# Patient Record
Sex: MALE | Race: BLACK OR AFRICAN AMERICAN | Employment: OTHER | URBAN - METROPOLITAN AREA
[De-identification: names, ages, dates, MRNs, and addresses within clinical notes are randomized per-mention and may not be internally consistent; named-entity substitution may affect disease eponyms.]

---

## 2019-07-08 ENCOUNTER — APPOINTMENT (OUTPATIENT)
Dept: GENERAL RADIOLOGY | Age: 39
End: 2019-07-08
Attending: EMERGENCY MEDICINE
Payer: SUBSIDIZED

## 2019-07-08 ENCOUNTER — HOSPITAL ENCOUNTER (OUTPATIENT)
Age: 39
Setting detail: OBSERVATION
LOS: 1 days | Discharge: HOME | End: 2019-07-10
Attending: EMERGENCY MEDICINE | Admitting: HOSPITALIST
Payer: SUBSIDIZED

## 2019-07-08 DIAGNOSIS — I50.9 ACUTE CONGESTIVE HEART FAILURE, UNSPECIFIED HEART FAILURE TYPE (HCC): Primary | ICD-10-CM

## 2019-07-08 PROCEDURE — 99285 EMERGENCY DEPT VISIT HI MDM: CPT

## 2019-07-08 PROCEDURE — 83880 ASSAY OF NATRIURETIC PEPTIDE: CPT

## 2019-07-08 PROCEDURE — 74011000250 HC RX REV CODE- 250: Performed by: EMERGENCY MEDICINE

## 2019-07-08 PROCEDURE — 94640 AIRWAY INHALATION TREATMENT: CPT

## 2019-07-08 PROCEDURE — 74011250637 HC RX REV CODE- 250/637: Performed by: EMERGENCY MEDICINE

## 2019-07-08 PROCEDURE — 71045 X-RAY EXAM CHEST 1 VIEW: CPT

## 2019-07-08 PROCEDURE — 84484 ASSAY OF TROPONIN QUANT: CPT

## 2019-07-08 PROCEDURE — 93005 ELECTROCARDIOGRAM TRACING: CPT

## 2019-07-08 PROCEDURE — 85025 COMPLETE CBC W/AUTO DIFF WBC: CPT

## 2019-07-08 PROCEDURE — 85379 FIBRIN DEGRADATION QUANT: CPT

## 2019-07-08 PROCEDURE — 94762 N-INVAS EAR/PLS OXIMTRY CONT: CPT

## 2019-07-08 PROCEDURE — 77030029684 HC NEB SM VOL KT MONA -A

## 2019-07-08 PROCEDURE — 80053 COMPREHEN METABOLIC PANEL: CPT

## 2019-07-08 RX ORDER — IPRATROPIUM BROMIDE AND ALBUTEROL SULFATE 2.5; .5 MG/3ML; MG/3ML
3 SOLUTION RESPIRATORY (INHALATION)
Status: COMPLETED | OUTPATIENT
Start: 2019-07-08 | End: 2019-07-08

## 2019-07-08 RX ADMIN — IPRATROPIUM BROMIDE AND ALBUTEROL SULFATE 3 ML: .5; 3 SOLUTION RESPIRATORY (INHALATION) at 23:16

## 2019-07-08 RX ADMIN — NITROGLYCERIN 1 INCH: 20 OINTMENT TOPICAL at 23:19

## 2019-07-09 ENCOUNTER — APPOINTMENT (OUTPATIENT)
Dept: NON INVASIVE DIAGNOSTICS | Age: 39
End: 2019-07-09
Attending: HOSPITALIST
Payer: SUBSIDIZED

## 2019-07-09 ENCOUNTER — HOME HEALTH ADMISSION (OUTPATIENT)
Dept: HOME HEALTH SERVICES | Facility: HOME HEALTH | Age: 39
End: 2019-07-09

## 2019-07-09 PROBLEM — I50.21 ACUTE SYSTOLIC CHF (CONGESTIVE HEART FAILURE) (HCC): Status: ACTIVE | Noted: 2019-07-09

## 2019-07-09 PROBLEM — I50.41 ACUTE COMBINED SYSTOLIC (CONGESTIVE) AND DIASTOLIC (CONGESTIVE) HEART FAILURE (HCC): Status: ACTIVE | Noted: 2019-07-09

## 2019-07-09 PROBLEM — I50.9 ACUTE CHF (CONGESTIVE HEART FAILURE) (HCC): Status: ACTIVE | Noted: 2019-07-09

## 2019-07-09 PROBLEM — E66.01 MORBID OBESITY (HCC): Status: ACTIVE | Noted: 2019-07-09

## 2019-07-09 PROBLEM — I10 HTN (HYPERTENSION): Status: ACTIVE | Noted: 2019-07-09

## 2019-07-09 LAB
ALBUMIN SERPL-MCNC: 3.5 G/DL (ref 3.4–5)
ALBUMIN/GLOB SERPL: 0.9 {RATIO} (ref 0.8–1.7)
ALP SERPL-CCNC: 98 U/L (ref 45–117)
ALT SERPL-CCNC: 38 U/L (ref 16–61)
ANION GAP SERPL CALC-SCNC: 6 MMOL/L (ref 3–18)
AST SERPL-CCNC: 21 U/L (ref 15–37)
ATRIAL RATE: 112 BPM
BASOPHILS # BLD: 0 K/UL (ref 0–0.1)
BASOPHILS NFR BLD: 0 % (ref 0–2)
BILIRUB SERPL-MCNC: 0.6 MG/DL (ref 0.2–1)
BNP SERPL-MCNC: 1132 PG/ML (ref 0–450)
BUN SERPL-MCNC: 12 MG/DL (ref 7–18)
BUN/CREAT SERPL: 13 (ref 12–20)
CALCIUM SERPL-MCNC: 9.1 MG/DL (ref 8.5–10.1)
CALCULATED P AXIS, ECG09: 70 DEGREES
CALCULATED R AXIS, ECG10: 25 DEGREES
CALCULATED T AXIS, ECG11: 65 DEGREES
CHLORIDE SERPL-SCNC: 107 MMOL/L (ref 100–108)
CHOLEST SERPL-MCNC: 200 MG/DL
CK MB CFR SERPL CALC: 1.9 % (ref 0–4)
CK MB CFR SERPL CALC: 1.9 % (ref 0–4)
CK MB SERPL-MCNC: 2 NG/ML (ref 5–25)
CK MB SERPL-MCNC: 2.1 NG/ML (ref 5–25)
CK SERPL-CCNC: 108 U/L (ref 39–308)
CK SERPL-CCNC: 110 U/L (ref 39–308)
CO2 SERPL-SCNC: 25 MMOL/L (ref 21–32)
CREAT SERPL-MCNC: 0.95 MG/DL (ref 0.6–1.3)
D DIMER PPP FEU-MCNC: 0.33 UG/ML(FEU)
DIAGNOSIS, 93000: NORMAL
DIFFERENTIAL METHOD BLD: NORMAL
EOSINOPHIL # BLD: 0.2 K/UL (ref 0–0.4)
EOSINOPHIL NFR BLD: 2 % (ref 0–5)
ERYTHROCYTE [DISTWIDTH] IN BLOOD BY AUTOMATED COUNT: 13 % (ref 11.6–14.5)
GLOBULIN SER CALC-MCNC: 3.8 G/DL (ref 2–4)
GLUCOSE SERPL-MCNC: 93 MG/DL (ref 74–99)
HCT VFR BLD AUTO: 42.3 % (ref 36–48)
HDLC SERPL-MCNC: 34 MG/DL (ref 40–60)
HDLC SERPL: 5.9 {RATIO} (ref 0–5)
HGB BLD-MCNC: 14.2 G/DL (ref 13–16)
LDLC SERPL CALC-MCNC: 135 MG/DL (ref 0–100)
LIPID PROFILE,FLP: ABNORMAL
LYMPHOCYTES # BLD: 3.5 K/UL (ref 0.9–3.6)
LYMPHOCYTES NFR BLD: 35 % (ref 21–52)
MCH RBC QN AUTO: 28.2 PG (ref 24–34)
MCHC RBC AUTO-ENTMCNC: 33.6 G/DL (ref 31–37)
MCV RBC AUTO: 84.1 FL (ref 74–97)
MONOCYTES # BLD: 0.8 K/UL (ref 0.05–1.2)
MONOCYTES NFR BLD: 8 % (ref 3–10)
NEUTS SEG # BLD: 5.5 K/UL (ref 1.8–8)
NEUTS SEG NFR BLD: 55 % (ref 40–73)
P-R INTERVAL, ECG05: 158 MS
PLATELET # BLD AUTO: 284 K/UL (ref 135–420)
PMV BLD AUTO: 10.2 FL (ref 9.2–11.8)
POTASSIUM SERPL-SCNC: 3.6 MMOL/L (ref 3.5–5.5)
PROT SERPL-MCNC: 7.3 G/DL (ref 6.4–8.2)
Q-T INTERVAL, ECG07: 354 MS
QRS DURATION, ECG06: 86 MS
QTC CALCULATION (BEZET), ECG08: 483 MS
RBC # BLD AUTO: 5.03 M/UL (ref 4.7–5.5)
SODIUM SERPL-SCNC: 138 MMOL/L (ref 136–145)
TRIGL SERPL-MCNC: 155 MG/DL (ref ?–150)
TROPONIN I SERPL-MCNC: 0.02 NG/ML (ref 0–0.04)
TROPONIN I SERPL-MCNC: <0.02 NG/ML (ref 0–0.04)
TROPONIN I SERPL-MCNC: <0.02 NG/ML (ref 0–0.04)
VENTRICULAR RATE, ECG03: 112 BPM
VLDLC SERPL CALC-MCNC: 31 MG/DL
WBC # BLD AUTO: 9.9 K/UL (ref 4.6–13.2)

## 2019-07-09 PROCEDURE — 74011250636 HC RX REV CODE- 250/636: Performed by: EMERGENCY MEDICINE

## 2019-07-09 PROCEDURE — 96376 TX/PRO/DX INJ SAME DRUG ADON: CPT

## 2019-07-09 PROCEDURE — 93306 TTE W/DOPPLER COMPLETE: CPT

## 2019-07-09 PROCEDURE — 80061 LIPID PANEL: CPT

## 2019-07-09 PROCEDURE — 74011250636 HC RX REV CODE- 250/636: Performed by: PHYSICIAN ASSISTANT

## 2019-07-09 PROCEDURE — 96374 THER/PROPH/DIAG INJ IV PUSH: CPT

## 2019-07-09 PROCEDURE — 99218 HC RM OBSERVATION: CPT

## 2019-07-09 PROCEDURE — 36415 COLL VENOUS BLD VENIPUNCTURE: CPT

## 2019-07-09 PROCEDURE — 74011250637 HC RX REV CODE- 250/637: Performed by: EMERGENCY MEDICINE

## 2019-07-09 PROCEDURE — 82550 ASSAY OF CK (CPK): CPT

## 2019-07-09 RX ORDER — CARVEDILOL 12.5 MG/1
6.25 TABLET ORAL
Status: COMPLETED | OUTPATIENT
Start: 2019-07-09 | End: 2019-07-09

## 2019-07-09 RX ORDER — LISINOPRIL 5 MG/1
5 TABLET ORAL
Status: COMPLETED | OUTPATIENT
Start: 2019-07-09 | End: 2019-07-09

## 2019-07-09 RX ORDER — FUROSEMIDE 10 MG/ML
40 INJECTION INTRAMUSCULAR; INTRAVENOUS 2 TIMES DAILY
Status: COMPLETED | OUTPATIENT
Start: 2019-07-09 | End: 2019-07-10

## 2019-07-09 RX ORDER — FUROSEMIDE 10 MG/ML
20 INJECTION INTRAMUSCULAR; INTRAVENOUS
Status: COMPLETED | OUTPATIENT
Start: 2019-07-09 | End: 2019-07-09

## 2019-07-09 RX ORDER — FUROSEMIDE 10 MG/ML
40 INJECTION INTRAMUSCULAR; INTRAVENOUS DAILY
Status: DISCONTINUED | OUTPATIENT
Start: 2019-07-09 | End: 2019-07-09

## 2019-07-09 RX ADMIN — FUROSEMIDE 40 MG: 10 INJECTION, SOLUTION INTRAMUSCULAR; INTRAVENOUS at 18:51

## 2019-07-09 RX ADMIN — CARVEDILOL 6.25 MG: 12.5 TABLET, FILM COATED ORAL at 00:43

## 2019-07-09 RX ADMIN — LISINOPRIL 5 MG: 5 TABLET ORAL at 00:43

## 2019-07-09 RX ADMIN — FUROSEMIDE 20 MG: 10 INJECTION, SOLUTION INTRAMUSCULAR; INTRAVENOUS at 00:43

## 2019-07-09 RX ADMIN — FUROSEMIDE 40 MG: 10 INJECTION, SOLUTION INTRAMUSCULAR; INTRAVENOUS at 12:48

## 2019-07-09 NOTE — CONSULTS
[facility] - Consult Note    Date of  Admission: 7/8/2019 11:05 PM   Primary Care Physician:  None  Patient seen and examined independently. The patient describes a history of increasing dyspnea with exertion and PND and orthopnea. Echo demonstrates marked reduction of systolic function and grade 2 diastolic dysfunction. Patient has single dose lisinopril. Will add Entresto 24/26 after suitable waiting. We will continue IV Lasix for diuresis. Agree with assessment and plan as noted below. Gilmer Ag MD   Assessment:     Patient Active Problem List   Diagnosis Code    Acute systolic CHF (congestive heart failure) ([facility]) I50.21    HTN (hypertension) I10    Morbid obesity ([facility] E66.01    Acute CH[facility] (congestive [facility] failure) ([facility] I50.9       - Clinical concern for acute CHF exacerbation with BNP 1132, vascular congestion on CXR without overt pulmonary edema  - Elevated blood pressure without official diagnosis of hypertension   - Obesity     Plan:     - Echo completed this admission with report to follow  - Starting IV Lasix for diuresis, strict I&O's, daily weights, follow renal function  - Will start low dose antihypertensive dependent on echo results     History of Present Illness: This is a 46 y.o. male admitted for Acute systolic CHF (congestive heart failure) ([facility] [I50.21]  Acute CHF (congestive heart failure) ([facility] [I50.9]. Patient complains of:  Shortness of breath     This is a 46year old male that presented with progressive shortness of breath, noticed over the last several months but acutely worsening last night. The shortness of breath progressed to while he was at rest in the bed, was unable to find a comfortable position. He had orthopnea and PND as well. He did not have any LE edema. Denies any chest pain. He believed that he may have had PNA and was trying to take Mucinex over the last several days, no fever or cough. Denies personal history of CAD or CHF.  He was told he had an isolated elevated BP reading while at urgent care several months ago, never was officially told of hypertension. Denies family history of CAD. Denies tobacco or illicits, only occasional ETOH use socially. Cardiac risk factors: obesity, male gender      Review of Symptoms:  Except as stated above include:  Constitutional:  negative  Respiratory:  +dyspnea  Cardiovascular: per HPI  Gastrointestinal: negative  Genitourinary:  negative  Musculoskeletal:  Negative  Neurological:  Negative  Dermatological:  Negative  Endocrinological: Negative  Psychological:  Negative    A comprehensive review of systems was negative except for that written in the HPI. Past Medical History:   History reviewed. No pertinent past medical history. Social History:     Social History     Socioeconomic History    Marital status: SINGLE     Spouse name: Not on file    Number of children: Not on file    Years of education: Not on file    Highest education level: Not on file   Tobacco Use    Smoking status: Never Smoker    Smokeless tobacco: Never Used   Substance and Sexual Activity    Alcohol use: Never     Frequency: Never    Drug use: Never        Family History:   History reviewed. No pertinent family history. Medications:   No Known Allergies     No current facility-administered medications for this encounter.           Physical Exam:     Visit Vitals  /87   Pulse 94   Temp 98.1 °F (36.7 °C)   Resp 18   Ht 5' 9\" (1.753 m)   Wt (!) 354 lb 3.2 oz (160.7 kg)   SpO2 95%   BMI 52.31 kg/m²     BP Readings from Last 3 Encounters:   07/09/19 118/87     Pulse Readings from Last 3 Encounters:   07/09/19 94     Wt Readings from Last 3 Encounters:   07/09/19 (!) 354 lb 3.2 oz (160.7 kg)       General:  alert, cooperative, no distress  Neck:  nontender, no JVD  Lungs:  Decreased breath sounds RLL  Heart:  regular rate and rhythm, S1, S2 normal, no murmur, click, rub or gallop  Abdomen:  abdomen is soft without significant tenderness, masses, organomegaly or guarding  Extremities:  extremities normal, atraumatic, no cyanosis or edema  Skin: Warm and dry. no hyperpigmentation, vitiligo, or suspicious lesions  Neuro: alert, oriented x3, affect appropriate  Psych: non focal     Data Review:     Recent Labs     07/08/19  2345   WBC 9.9   HGB 14.2   HCT 42.3        Recent Labs     07/08/19  2345      K 3.6      CO2 25   GLU 93   BUN 12   CREA 0.95   CA 9.1   ALB 3.5   SGOT 21   ALT 38       Results for orders placed or performed during the hospital encounter of 07/08/19   EKG, 12 LEAD, INITIAL   Result Value Ref Range    Ventricular Rate 112 BPM    Atrial Rate 112 BPM    P-R Interval 158 ms    QRS Duration 86 ms    Q-T Interval 354 ms    QTC Calculation (Bezet) 483 ms    Calculated P Axis 70 degrees    Calculated R Axis 25 degrees    Calculated T Axis 65 degrees    Diagnosis       Sinus tachycardia  Nonspecific T wave abnormality  Abnormal ECG  No previous ECGs available         All Cardiac Markers in the last 24 hours:    Lab Results   Component Value Date/Time     07/09/2019 06:10 AM    CKMB 2.1 07/09/2019 06:10 AM    CKND1 1.9 07/09/2019 06:10 AM    TROIQ 0.02 07/09/2019 06:10 AM    TROIQ <0.02 07/08/2019 11:45 PM       Last Lipid:  No results found for: CHOL, CHOLX, CHLST, CHOLV, HDL, LDL, LDLC, DLDLP, TGLX, TRIGL, TRIGP, CHHD, CHHDX    Signed By: Milla Barnes December July 9, 2019

## 2019-07-09 NOTE — ED PROVIDER NOTES
Imelda Greenwood is a 46 y.o. Male with no prior significant medical history with complaints of increased shortness of breath with exertion and lying flat for the last several months along with swelling in his lower extremities. Patient denies any fever, productive cough, vomiting or diarrhea. Patient denies any prior history of hypertension, congestive heart failure, chronic kidney disease, smoking or diabetes. Patient has no prior history of chronic lung disease other. He states he went to an urgent care where he was told nothing was wrong    The history is provided by the patient. History reviewed. No pertinent past medical history. History reviewed. No pertinent surgical history. History reviewed. No pertinent family history.     Social History     Socioeconomic History    Marital status: SINGLE     Spouse name: Not on file    Number of children: Not on file    Years of education: Not on file    Highest education level: Not on file   Occupational History    Not on file   Social Needs    Financial resource strain: Not on file    Food insecurity:     Worry: Not on file     Inability: Not on file    Transportation needs:     Medical: Not on file     Non-medical: Not on file   Tobacco Use    Smoking status: Never Smoker    Smokeless tobacco: Never Used   Substance and Sexual Activity    Alcohol use: Never     Frequency: Never    Drug use: Never    Sexual activity: Not on file   Lifestyle    Physical activity:     Days per week: Not on file     Minutes per session: Not on file    Stress: Not on file   Relationships    Social connections:     Talks on phone: Not on file     Gets together: Not on file     Attends Evangelical service: Not on file     Active member of club or organization: Not on file     Attends meetings of clubs or organizations: Not on file     Relationship status: Not on file    Intimate partner violence:     Fear of current or ex partner: Not on file     Emotionally abused: Not on file     Physically abused: Not on file     Forced sexual activity: Not on file   Other Topics Concern    Not on file   Social History Narrative    Not on file         ALLERGIES: Patient has no known allergies. Review of Systems   Constitutional: Negative for fever. HENT: Negative for sore throat. Eyes: Negative for visual disturbance. Respiratory: Positive for cough, chest tightness, shortness of breath and wheezing. Cardiovascular: Positive for leg swelling. Negative for chest pain. Gastrointestinal: Negative for abdominal pain. Endocrine: Negative for polyuria. Genitourinary: Negative for difficulty urinating. Musculoskeletal: Negative for gait problem. Skin: Negative for rash. Allergic/Immunologic: Negative for immunocompromised state. Neurological: Negative for syncope. Psychiatric/Behavioral: Positive for sleep disturbance. Vitals:    07/09/19 0006 07/09/19 0007 07/09/19 0008 07/09/19 0009   BP:       Pulse: (!) 112 (!) 111 (!) 110 (!) 111   Resp: 19 16 19 19   Temp:       SpO2: 97% 97% 96% 96%   Weight:       Height:                Physical Exam   Constitutional: He is oriented to person, place, and time. He appears well-developed and well-nourished. Non-toxic appearance. He does not appear ill. No distress. HENT:   Head: Normocephalic and atraumatic. Right Ear: External ear normal.   Left Ear: External ear normal.   Nose: Nose normal.   Mouth/Throat: Oropharynx is clear and moist. No oropharyngeal exudate. Eyes: Pupils are equal, round, and reactive to light. Conjunctivae and EOM are normal.   Neck: Normal range of motion. Cardiovascular: Regular rhythm, normal heart sounds and intact distal pulses. Tachycardia present. Pulmonary/Chest: Effort normal. No respiratory distress. He has no decreased breath sounds. He has wheezes. He has no rhonchi. He has no rales. Abdominal: Soft. There is no tenderness. Musculoskeletal: Normal range of motion. He exhibits edema. Neurological: He is alert and oriented to person, place, and time. Skin: Skin is warm and dry. Capillary refill takes less than 2 seconds. He is not diaphoretic. Psychiatric: His behavior is normal.   Nursing note and vitals reviewed.        MDM       Procedures  Vitals:  Patient Vitals for the past 12 hrs:   Temp Pulse Resp BP SpO2   07/09/19 0009  (!) 111 19  96 %   07/09/19 0008  (!) 110 19  96 %   07/09/19 0007  (!) 111 16  97 %   07/09/19 0006  (!) 112 19  97 %   07/09/19 0001  (!) 111 21 (!) 145/99 96 %   07/09/19 0000  (!) 110 22  95 %   07/08/19 2353  (!) 112 20  95 %   07/08/19 2351  (!) 111 17 164/88 95 %   07/08/19 2348  (!) 115 21  95 %   07/08/19 2347  (!) 114 19  94 %   07/08/19 2346  (!) 114 21  95 %   07/08/19 2345  (!) 114 20  96 %   07/08/19 2344  (!) 115 23  96 %   07/08/19 2330  (!) 117 16  97 %   07/08/19 2322     98 %   07/08/19 2319  (!) 112  (!) 140/105    07/08/19 2315  (!) 113 24  99 %   07/08/19 2310    (!) 140/105 95 %   07/08/19 2300 98.2 °F (36.8 °C) (!) 112 24  95 %         Medications ordered:   Medications   furosemide (LASIX) injection 20 mg (has no administration in time range)   carvedilol (COREG) tablet 6.25 mg (has no administration in time range)   lisinopril (PRINIVIL, ZESTRIL) tablet 5 mg (has no administration in time range)   albuterol-ipratropium (DUO-NEB) 2.5 MG-0.5 MG/3 ML (3 mL Nebulization Given 7/8/19 2316)   nitroglycerin (NITROBID) 2 % ointment 1 Inch (1 Inch Topical Given 7/8/19 2319)         Lab findings:  Recent Results (from the past 12 hour(s))   EKG, 12 LEAD, INITIAL    Collection Time: 07/08/19 11:07 PM   Result Value Ref Range    Ventricular Rate 112 BPM    Atrial Rate 112 BPM    P-R Interval 158 ms    QRS Duration 86 ms    Q-T Interval 354 ms    QTC Calculation (Bezet) 483 ms    Calculated P Axis 70 degrees    Calculated R Axis 25 degrees    Calculated T Axis 65 degrees    Diagnosis       Sinus tachycardia  Nonspecific T wave abnormality  Abnormal ECG  No previous ECGs available     CBC WITH AUTOMATED DIFF    Collection Time: 07/08/19 11:45 PM   Result Value Ref Range    WBC 9.9 4.6 - 13.2 K/uL    RBC 5.03 4.70 - 5.50 M/uL    HGB 14.2 13.0 - 16.0 g/dL    HCT 42.3 36.0 - 48.0 %    MCV 84.1 74.0 - 97.0 FL    MCH 28.2 24.0 - 34.0 PG    MCHC 33.6 31.0 - 37.0 g/dL    RDW 13.0 11.6 - 14.5 %    PLATELET 621 445 - 503 K/uL    MPV 10.2 9.2 - 11.8 FL    NEUTROPHILS 55 40 - 73 %    LYMPHOCYTES 35 21 - 52 %    MONOCYTES 8 3 - 10 %    EOSINOPHILS 2 0 - 5 %    BASOPHILS 0 0 - 2 %    ABS. NEUTROPHILS 5.5 1.8 - 8.0 K/UL    ABS. LYMPHOCYTES 3.5 0.9 - 3.6 K/UL    ABS. MONOCYTES 0.8 0.05 - 1.2 K/UL    ABS. EOSINOPHILS 0.2 0.0 - 0.4 K/UL    ABS. BASOPHILS 0.0 0.0 - 0.1 K/UL    DF AUTOMATED     METABOLIC PANEL, COMPREHENSIVE    Collection Time: 07/08/19 11:45 PM   Result Value Ref Range    Sodium 138 136 - 145 mmol/L    Potassium 3.6 3.5 - 5.5 mmol/L    Chloride 107 100 - 108 mmol/L    CO2 25 21 - 32 mmol/L    Anion gap 6 3.0 - 18 mmol/L    Glucose 93 74 - 99 mg/dL    BUN 12 7.0 - 18 MG/DL    Creatinine 0.95 0.6 - 1.3 MG/DL    BUN/Creatinine ratio 13 12 - 20      GFR est AA >60 >60 ml/min/1.73m2    GFR est non-AA >60 >60 ml/min/1.73m2    Calcium 9.1 8.5 - 10.1 MG/DL    Bilirubin, total 0.6 0.2 - 1.0 MG/DL    ALT (SGPT) 38 16 - 61 U/L    AST (SGOT) 21 15 - 37 U/L    Alk.  phosphatase 98 45 - 117 U/L    Protein, total 7.3 6.4 - 8.2 g/dL    Albumin 3.5 3.4 - 5.0 g/dL    Globulin 3.8 2.0 - 4.0 g/dL    A-G Ratio 0.9 0.8 - 1.7     NT-PRO BNP    Collection Time: 07/08/19 11:45 PM   Result Value Ref Range    NT pro-BNP 1,132 (H) 0 - 450 PG/ML   TROPONIN I    Collection Time: 07/08/19 11:45 PM   Result Value Ref Range    Troponin-I, QT <0.02 0.0 - 0.045 NG/ML   D DIMER    Collection Time: 07/08/19 11:45 PM   Result Value Ref Range    D DIMER 0.33 <0.46 ug/ml(FEU)       EKG interpretation by ED Physician:  Sinus tach with nonspecific T wave abnormality. No acute ST changes. Rate 112 QRS 86 QTC 43  No previous EKG    Cardiac monitor: Tachycardia with regular rhythm and no ectopy  Pulse ox: 95% room air    X-Ray, CT or other radiology findings or impressions:  XR CHEST PORT    (Results Pending)   Cardiomegaly with slight pulmonary edema. Progress notes, Consult notes or additional Procedure notes:   Patient appears comfortable but likely has new onset CHF. Feel patient would benefit from short stay inpatient for further work-up    Discussed with Dr. Marion Iverson who will admit    Reevaluation of patient:   stable    Disposition:  Diagnosis:   1.  Acute congestive heart failure, unspecified heart failure type [facility])        Disposition: admit    Follow-up Information    None           Patient's Medications    No medications on file

## 2019-07-09 NOTE — PROGRESS NOTES
Problem: Heart Failure: Discharge Outcomes  Goal: *Demonstrates ability to perform prescribed activity without shortness of breath or discomfort  Outcome: Progressing Towards Goal  Goal: *Left ventricular function assessment completed prior to or during stay, or planned for post-discharge  Outcome: Progressing Towards Goal  Goal: *ACEI prescribed if LVEF less than 40% and no contraindications or ARB prescribed  Outcome: Progressing Towards Goal  Goal: *Verbalizes understanding and describes prescribed diet  Outcome: Progressing Towards Goal  Goal: *Verbalizes understanding/describes prescribed medications  Outcome: Progressing Towards Goal  Goal: *Describes available resources and support systems  Description  (eg: Home Health, Palliative Care, Advanced Medical Directive)  Outcome: Progressing Towards Goal  Goal: *Understands and describes signs and symptoms to report to providers(Stroke Metric)  Outcome: Progressing Towards Goal  Goal: *Describes/verbalizes understanding of follow-up/return appt  Description  (eg: to physicians, diabetes treatment coordinator, and other resources  Outcome: Progressing Towards Goal  Goal: *Describes importance of continuing daily weights and changes to report to physician  Outcome: Progressing Towards Goal     Problem: General Medical Care Plan  Goal: *Vital signs within specified parameters  Outcome: Progressing Towards Goal  Goal: *Labs within defined limits  Outcome: Progressing Towards Goal  Goal: *Absence of infection signs and symptoms  Outcome: Progressing Towards Goal  Goal: *Optimal pain control at patient's stated goal  Outcome: Progressing Towards Goal  Goal: *Skin integrity maintained  Outcome: Progressing Towards Goal  Goal: *Fluid volume balance  Outcome: Progressing Towards Goal  Goal: *Optimize nutritional status  Outcome: Progressing Towards Goal  Goal: *Anxiety reduced or absent  Outcome: Progressing Towards Goal  Goal: *Progressive mobility and function (eg: ADL's)  Outcome: Progressing Towards Goal     Problem: Patient Education: Go to Patient Education Activity  Goal: Patient/Family Education  Outcome: Progressing Towards Goal     Problem: Falls - Risk of  Goal: *Absence of Falls  Description  Document Niesha Hernandez Fall Risk and appropriate interventions in the flowsheet.   Outcome: Progressing Towards Goal     Problem: Pain  Goal: *Control of Pain  Outcome: Progressing Towards Goal     Problem: Hypertension  Goal: *Blood pressure within specified parameters  Outcome: Progressing Towards Goal  Goal: *Fluid volume balance  Outcome: Progressing Towards Goal  Goal: *Labs within defined limits  Outcome: Progressing Towards Goal

## 2019-07-09 NOTE — H&P
History and Physical    Patient: Jessica Dhaliwal               Sex: male          DOA: 7/8/2019       YOB: 1980      Age:  46 y.o.        LOS:  LOS: 0 days        HPI:     Jessica Dhaliwal is a 46 y.o. male who presented to the ER with SOB. He has had this for several months, but it acutely worsened over the past two days. He does not have chest pain. He has dry cough. He has no foot swelling, no syncope. The SOB is worse with activity. He has never had heart disease. In the ER he is found have CHF and will be admitted for ongoing management. Past Medical History:   HTN    Social History:   Tobacco use:  Patient does not smoke   Alcohol use:  Patient does not use alcohol   Occupation:  Patient is     Family History: Mother has Guillian-[place] syndrome    Review of Systems    Constitutional:  No fever or weight loss  HEENT:  No headache or visual changes  Cardiovascular:  No chest pain or diaphoresis  Respiratory:  Dyspnea on exertion with cough as above  GI:  No nausea or vomitting. No diarrhea  :  No hematuria or dysuria  Skin:  No rashes or moles  Neuro:  No seizures or syncope  Hematological:  No bruising or bleeding  Endocrine:  No diabetes or thyroid disease    Physical Exam:      Visit Vitals  /80   Pulse (!) 108   Temp 98.3 °F (36.8 °C)   Resp 16   Ht 5' 9\" (1.753 m)   Wt (!) 160.7 kg (354 lb 3.2 oz)   SpO2 95%   BMI 52.31 kg/m²       Physical Exam:    Gen:  No distress, alert  HEENT:  Normal cephalic atraumatic, extra-occular movements are intact. Neck:  Supple, No JVD  Lungs:  Clear bilaterally, no wheeze, no rales, normal effort  Heart:  Regular Rate and Rhythm, normal S1 and S2, no edema  Abdomen:  Soft, non tender, normal bowel sounds, no guarding.   Extremities:  Well perfused, no cyanosis or edema  Neurological:  Awake and alert, CN's are intact, normal strength throughout extremities  Skin:  No rashes or moles  Mental Status:  Normal thought process, does not appear anxious    Laboratory Studies:    BMP:   Lab Results   Component Value Date/Time     07/08/2019 11:45 PM    K 3.6 07/08/2019 11:45 PM     07/08/2019 11:45 PM    CO2 25 07/08/2019 11:45 PM    AGAP 6 07/08/2019 11:45 PM    GLU 93 07/08/2019 11:45 PM    BUN 12 07/08/2019 11:45 PM    CREA 0.95 07/08/2019 11:45 PM    GFRAA >60 07/08/2019 11:45 PM    GFRNA >60 07/08/2019 11:45 PM     CBC:   Lab Results   Component Value Date/Time    WBC 9.9 07/08/2019 11:45 PM    HGB 14.2 07/08/2019 11:45 PM    HCT 42.3 07/08/2019 11:45 PM     07/08/2019 11:45 PM     All Cardiac Markers in the last 24 hours:   Lab Results   Component Value Date/Time    TROIQ <0.02 07/08/2019 11:45 PM       Assessment/Plan     Principal Problem:    Acute systolic CHF (congestive heart failure) ([facility] (7/9/2019)    Active Problems:    HTN (hypertension) (7/9/2019)      Morbid obesity ([facility] [address] (7/9/2019)        PLAN:    Serial cardiac enzymes  Diuresis  BP control  Echocardiogram  Cardiology consult

## 2019-07-09 NOTE — ED NOTES
TRANSFER - ED to INPATIENT REPORT:    SBAR report made available to receiving floor on this patient being transferred to [address] [place] [address] ([facility])  for routine progression of care       Admitting diagnosis Acute systolic CHF (congestive heart failure) ([facility] [I50.21]    Information from the following report(s) SBAR, Kardex, ED Summary and MAR was made available to receiving floor. Lines:   Peripheral IV 07/08/19 Left Antecubital (Active)   Site Assessment Clean, dry, & intact 7/8/2019 11:45 PM   Phlebitis Assessment 0 7/8/2019 11:45 PM   Infiltration Assessment 0 7/8/2019 11:45 PM   Dressing Status Clean, dry, & intact 7/8/2019 11:45 PM   Dressing Type Transparent 7/8/2019 11:45 PM   Hub Color/Line Status Green 7/8/2019 11:45 PM        Medication list confirmed with patient    Opportunity for questions and clarification was provided.       Patient is oriented to time, place, person and situation   Patient is  continent and ambulatory without assist     Valuables transported with patient     Patient transported with:   Monitor  Registered Nurse    MAP (Monitor): 112 =Monitored (most recent)  Vitals w/ MEWS Score (last day)     Date/Time MEWS Score Pulse Resp Temp BP Level of Consciousness SpO2    07/09/19 0046    111  (Abnormal)   18        96 %    07/09/19 0045    112  (Abnormal)   18        95 %    07/09/19 0044    111  (Abnormal)   18        95 %    07/09/19 0043    112  (Abnormal)   25    153/100  (Abnormal)     96 %    07/09/19 0042    111  (Abnormal)   20        96 %    07/09/19 0041    110  (Abnormal)   18        97 %    07/09/19 0040    112  (Abnormal)   22        97 %    07/09/19 0039    109  (Abnormal)   20        96 %    07/09/19 0038    110  (Abnormal)   19        96 %    07/09/19 0037    109  (Abnormal)   16        96 %    07/09/19 0036    110  (Abnormal)   19        95 %    07/09/19 0035    110  (Abnormal)   18        97 %    07/09/19 0034    110  (Abnormal)   19       95 %    07/09/19 0033    109  (Abnormal)   16        96 %    07/09/19 0032    112  (Abnormal)   14        96 %    07/09/19 0031    110  (Abnormal)   14        96 %    07/09/19 0030    111  (Abnormal)   14    153/100  (Abnormal)     96 %    07/09/19 0029    109  (Abnormal)   16        96 %    07/09/19 0028    112  (Abnormal)   18        95 %    07/09/19 0027    111  (Abnormal)   18        97 %    07/09/19 0026    111  (Abnormal)   19        96 %    07/09/19 0025    113  (Abnormal)   19        96 %    07/09/19 0024    112  (Abnormal)   21        97 %    07/09/19 0023    111  (Abnormal)   22        97 %    07/09/19 0022    110  (Abnormal)   20        96 %    07/09/19 0021    112  (Abnormal)   24        96 %    07/09/19 0020    108  (Abnormal)   19        95 %    07/09/19 0019    112  (Abnormal)   19        95 %    07/09/19 0018    111  (Abnormal)   18        95 %    07/09/19 0017    110  (Abnormal)   25        95 %    07/09/19 0016    112  (Abnormal)   27        96 %    07/09/19 0015    112  (Abnormal)   20        97 %    07/09/19 0014    110  (Abnormal)   18        96 %    07/09/19 0013    110  (Abnormal)   20        96 %    07/09/19 0012    111  (Abnormal)   18        97 %    07/09/19 0011    110  (Abnormal)   18        97 %    07/09/19 0009    111  (Abnormal)   19        96 %    07/09/19 0008    110  (Abnormal)   19        96 %    07/09/19 0007    111  (Abnormal)   16        97 %    07/09/19 0006    112  (Abnormal)   19        97 %    07/09/19 0001    111  (Abnormal)   21    145/99  (Abnormal)     96 %    07/09/19 0000    110  (Abnormal)   22        95 %    07/08/19 2353    112  (Abnormal)   20        95 %    07/08/19 2351    111  (Abnormal)   17    164/88    95 %    07/08/19 2348    115  (Abnormal)   21        95 %    07/08/19 2347    114  (Abnormal)   19        94 %    07/08/19 2346    114  (Abnormal)   21        95 %    07/08/19 2345    114  (Abnormal)   20        96 %    07/08/19 2344    115  (Abnormal)   23        96 %    07/08/19 2330    117  (Abnormal)   16        97 %    07/08/19 23:22:20              98 %    07/08/19 2319    112  (Abnormal)       140/105  (Abnormal)         07/08/19 2315    113  (Abnormal)   24        99 %    07/08/19 2310          140/105  (Abnormal)     95 %    07/08/19 2300    112  (Abnormal)   24  98.2 °F (36.8 °C)    Alert  95 %              Septic Patients:     Lactic Acid  No results found for: LACPOC (Most recent on top)

## 2019-07-09 NOTE — PROGRESS NOTES
conducted an initial consultation and Spiritual Assessment for Yolanda Sanchez, who is a 46 y. o.,male. Patients Primary Language is: [place]. According to the patients EMR Pentecostalism Affiliation is: No preference. The reason the Patient came to the hospital is:   Patient Active Problem List    Diagnosis Date Noted    Acute systolic CHF (congestive heart failure) ([facility] 07/09/2019    HTN (hypertension) 07/09/2019    Morbid obesity ([facility] 07/09/2019    Acute CHF (congestive heart failure) ([facility] 07/09/2019        The  provided the following Interventions:  Initiated a relationship of care and support. Explored issues of gerardo, spirituality and/or Rastafari needs while hospitalized. Listened empathically. Provided chaplaincy education. Provided information about Spiritual Care Services. Offered prayer and assurance of continued prayers on patient's behalf. Chart reviewed. The following outcomes were achieved:  Patient shared some information about their medical narrative and spiritual journey/beliefs. Patient processed feeling about current hospitalization. Patient expressed gratitude for the 's visit. Assessment:  Patient did not indicate any spiritual or Rastafari issues which require Spiritual Care Services interventions at this time. Patient does not have any Rastafari/cultural needs that will affect patients preferences in health care. Plan:  Chaplains will continue to follow and will provide pastoral care on an as needed or requested basis.  recommends bedside caregivers page  on duty if patient shows signs of acute spiritual or emotional distress.     [address] [facility]   Staff [address] [facility]   [phone]

## 2019-07-09 NOTE — ED TRIAGE NOTES
Patient comes in stating that he has a cough and shortness of breath for about a year now, was seen at urgent care in the past but was told that nothing was wrong, just that he had irregularities in his EKG. Patient states that he does not have chest pain anymore, just feels a tightness. Patient states that he sometimes coughs up tan mucous. Patient states that he thought the shortness of breath was related to stress and anxiety but he states that right now he is not stressed or anxious and he is still short of breath.

## 2019-07-09 NOTE — PROGRESS NOTES
7/8/19 0115 TRANSFER - IN REPORT:    Pt being received from ED (unit) for routine progression of care. Report received from [facility]    Report consisted of patients Situation, Background, Assessment and   Recommendations(SBAR). Information from the following report(s) SBAR, Procedure Summary, MAR and Recent Results was reviewed with the receiving nurse. Opportunity for questions and clarification was provided. Assessment completed upon patients arrival to unit and care assumed. Required documentation completed    Pt awake, alert and oriented x4. Pt ambulating in room without dyspnea. Reviewed plan of care and safety protocols with patient and significant other. Provided heart failure packet and started heart failure education with patient and significant other at bedside. 0400  Shift reassessment, pt condition unchanged, will continue to monitor. 0700 Bedside, Verbal and Written shift change report given to Dejuan RN (oncoming nurse) by Maribel Londono RN (offgoing nurse). Report included the following information SBAR, Kardex, Intake/Output, MAR and Recent Results. Skin assessment completed.

## 2019-07-09 NOTE — PROGRESS NOTES
0710: Bedside shift change report given to Dejuan RN (oncoming nurse) by Freida Hendrickson RN (offgoing nurse). Report included the following information SBAR, Kardex, Procedure Summary, Intake/Output, MAR and Cardiac Rhythm SR. Visitor at bedside. 0830: Dr. Errol Copeland in patient room discussing next steps plan. Echo to be done today, also stated that I could removed nitro paste. 1604: Spoke with Radha Ferrara from [employer]. About cardiac enzymes Q 6 hr for 3 occurences. Per order they are no longer needed. 1945: Bedside shift change report given to [facility] RN (oncoming nurse) by Suraj Back RN (offgoing nurse). Report included the following information SBAR, Kardex, Procedure Summary, Intake/Output, MAR and Cardiac Rhythm SR/ST.

## 2019-07-09 NOTE — PROGRESS NOTES
Problem: Discharge Planning  Goal: *Discharge to safe environment  Outcome: Progressing Towards Goal    Home with [facility] CHF       Care Management Interventions  PCP Verified by CM: Yes  Palliative Care Criteria Met (RRAT>21 & CHF Dx)?: No  Transition of Care Consult (CM Consult): Discharge Planning  Current Support Network: (mother)  Confirm Follow Up Transport: Family(Uber or Family)  Plan discussed with Pt/Family/Caregiver: Yes  Discharge Location  Discharge Placement: Home(H2H CHF)     Reason for Admission:   HF                   RRAT Score:    9                 Plan for utilizing home health:   [facility] CHF                       Current Advanced Directive/Advance Care Plan: Not at this time  And not interested at this time                         Transition of Care Plan:     Spoke with patient in room, he stated that he is independent with his care /ADL'sand uses no DME's. He verified his address and phone # as correct on the facesheet. Has no insurance or PCP.  consult ordered. Patient agreeable to [facility] with [facility] upon discharge. Patient has the support of his mother and she will take him home or he will call an uber per patient. Patient has designated ___mother Merline Solian [phone]_____________________ to participate in his/her discharge plan and to receive any needed information. [facility] & [address] Provider list has been given to the patient and/or patient representative. Patient and/or patient representative has signed the [place] of Choice selecting ____H2H CHF_____________________as their preference agency and a copy given. Both Home Health Provider list and Freedom of Choice have been placed on the chart.       Referral placed in [facility] and called to [facility]

## 2019-07-09 NOTE — ED NOTES
PT A&OX4, patent airway, non-labored breathing, PERRLA, skin warm/dry, C/O coughing and SOB on and off x 1 year. Increased today while walking.  PT denies chest pain

## 2019-07-09 NOTE — PROGRESS NOTES
Problem: Heart Failure: Day 2  Goal: Activity/Safety  Outcome: Progressing Towards Goal  Goal: Consults, if ordered  Outcome: Progressing Towards Goal  Goal: Diagnostic Test/Procedures  Outcome: Progressing Towards Goal  Goal: Nutrition/Diet  Outcome: Progressing Towards Goal  Goal: Discharge Planning  Outcome: Progressing Towards Goal  Goal: Medications  Outcome: Progressing Towards Goal  Goal: Respiratory  Outcome: Progressing Towards Goal  Goal: Treatments/Interventions/Procedures  Outcome: Progressing Towards Goal  Goal: Psychosocial  Outcome: Progressing Towards Goal  Goal: *Oxygen saturation within defined limits  Outcome: Progressing Towards Goal  Goal: *Hemodynamically stable  Outcome: Progressing Towards Goal  Goal: *Optimal pain control at patient's stated goal  Outcome: Progressing Towards Goal  Goal: *Anxiety reduced or absent  Outcome: Progressing Towards Goal  Goal: *Demonstrates progressive activity  Outcome: Progressing Towards Goal     Problem: Heart Failure: Day 3  Goal: Activity/Safety  Outcome: Progressing Towards Goal  Goal: Diagnostic Test/Procedures  Outcome: Progressing Towards Goal  Goal: Nutrition/Diet  Outcome: Progressing Towards Goal  Goal: Discharge Planning  Outcome: Progressing Towards Goal  Goal: Medications  Outcome: Progressing Towards Goal  Goal: Respiratory  Outcome: Progressing Towards Goal  Goal: Treatments/Interventions/Procedures  Outcome: Progressing Towards Goal  Goal: Psychosocial  Outcome: Progressing Towards Goal  Goal: *Oxygen saturation within defined limits  Outcome: Progressing Towards Goal  Goal: *Hemodynamically stable  Outcome: Progressing Towards Goal  Goal: *Optimal pain control at patient's stated goal  Outcome: Progressing Towards Goal  Goal: *Anxiety reduced or absent  Outcome: Progressing Towards Goal  Goal: *Demonstrates progressive activity  Outcome: Progressing Towards Goal     Problem: Heart Failure: Day 4  Goal: Off Pathway (Use only if patient is Off Pathway)  Outcome: Progressing Towards Goal  Goal: Activity/Safety  Outcome: Progressing Towards Goal  Goal: Diagnostic Test/Procedures  Outcome: Progressing Towards Goal  Goal: Nutrition/Diet  Outcome: Progressing Towards Goal  Goal: Discharge Planning  Outcome: Progressing Towards Goal  Goal: Medications  Outcome: Progressing Towards Goal  Goal: Respiratory  Outcome: Progressing Towards Goal  Goal: Treatments/Interventions/Procedures  Outcome: Progressing Towards Goal  Goal: Psychosocial  Outcome: Progressing Towards Goal  Goal: *Oxygen saturation within defined limits  Outcome: Progressing Towards Goal  Goal: *Hemodynamically stable  Outcome: Progressing Towards Goal  Goal: *Optimal pain control at patient's stated goal  Outcome: Progressing Towards Goal  Goal: *Anxiety reduced or absent  Outcome: Progressing Towards Goal  Goal: *Demonstrates progressive activity  Outcome: Progressing Towards Goal     Problem: Heart Failure: Day 5  Goal: Activity/Safety  Outcome: Progressing Towards Goal  Goal: Diagnostic Test/Procedures  Outcome: Progressing Towards Goal  Goal: Nutrition/Diet  Outcome: Progressing Towards Goal  Goal: Discharge Planning  Outcome: Progressing Towards Goal  Goal: Medications  Outcome: Progressing Towards Goal  Goal: Respiratory  Outcome: Progressing Towards Goal  Goal: Treatments/Interventions/Procedures  Outcome: Progressing Towards Goal  Goal: Psychosocial  Outcome: Progressing Towards Goal     Problem: Heart Failure: Discharge Outcomes  Goal: *Demonstrates ability to perform prescribed activity without shortness of breath or discomfort  Outcome: Progressing Towards Goal  Goal: *Left ventricular function assessment completed prior to or during stay, or planned for post-discharge  Outcome: Progressing Towards Goal  Goal: *ACEI prescribed if LVEF less than 40% and no contraindications or ARB prescribed  Outcome: Progressing Towards Goal  Goal: *Verbalizes understanding and describes prescribed diet  Outcome: Progressing Towards Goal  Goal: *Verbalizes understanding/describes prescribed medications  Outcome: Progressing Towards Goal  Goal: *Describes available resources and support systems  Description  (eg: Home Health, Palliative Care, Advanced Medical Directive)  Outcome: Progressing Towards Goal  Goal: *Understands and describes signs and symptoms to report to providers(Stroke Metric)  Outcome: Progressing Towards Goal  Goal: *Describes/verbalizes understanding of follow-up/return appt  Description  (eg: to physicians, diabetes treatment coordinator, and other resources  Outcome: Progressing Towards Goal  Goal: *Describes importance of continuing daily weights and changes to report to physician  Outcome: Progressing Towards Goal     Problem: General Medical Care Plan  Goal: *Vital signs within specified parameters  Outcome: Progressing Towards Goal  Goal: *Labs within defined limits  Outcome: Progressing Towards Goal  Goal: *Absence of infection signs and symptoms  Outcome: Progressing Towards Goal  Goal: *Optimal pain control at patient's stated goal  Outcome: Progressing Towards Goal  Goal: *Skin integrity maintained  Outcome: Progressing Towards Goal  Goal: *Fluid volume balance  Outcome: Progressing Towards Goal  Goal: *Optimize nutritional status  Outcome: Progressing Towards Goal  Goal: *Anxiety reduced or absent  Outcome: Progressing Towards Goal  Goal: *Progressive mobility and function (eg: ADL's)  Outcome: Progressing Towards Goal     Problem: Patient Education: Go to Patient Education Activity  Goal: Patient/Family Education  Outcome: Progressing Towards Goal

## 2019-07-10 VITALS
OXYGEN SATURATION: 97 % | BODY MASS INDEX: 46.65 KG/M2 | DIASTOLIC BLOOD PRESSURE: 73 MMHG | TEMPERATURE: 97.9 F | RESPIRATION RATE: 16 BRPM | SYSTOLIC BLOOD PRESSURE: 115 MMHG | HEIGHT: 69 IN | WEIGHT: 315 LBS | HEART RATE: 98 BPM

## 2019-07-10 PROBLEM — E78.5 HYPERLIPIDEMIA: Status: ACTIVE | Noted: 2019-07-10

## 2019-07-10 LAB
ANION GAP SERPL CALC-SCNC: 7 MMOL/L (ref 3–18)
BASOPHILS # BLD: 0 K/UL (ref 0–0.1)
BASOPHILS NFR BLD: 0 % (ref 0–2)
BUN SERPL-MCNC: 13 MG/DL (ref 7–18)
BUN/CREAT SERPL: 13 (ref 12–20)
CALCIUM SERPL-MCNC: 8.7 MG/DL (ref 8.5–10.1)
CHLORIDE SERPL-SCNC: 102 MMOL/L (ref 100–108)
CO2 SERPL-SCNC: 28 MMOL/L (ref 21–32)
CREAT SERPL-MCNC: 1 MG/DL (ref 0.6–1.3)
DIFFERENTIAL METHOD BLD: NORMAL
EOSINOPHIL # BLD: 0.3 K/UL (ref 0–0.4)
EOSINOPHIL NFR BLD: 3 % (ref 0–5)
ERYTHROCYTE [DISTWIDTH] IN BLOOD BY AUTOMATED COUNT: 12.9 % (ref 11.6–14.5)
GLUCOSE SERPL-MCNC: 84 MG/DL (ref 74–99)
HCT VFR BLD AUTO: 44.5 % (ref 36–48)
HGB BLD-MCNC: 14.6 G/DL (ref 13–16)
LYMPHOCYTES # BLD: 2.7 K/UL (ref 0.9–3.6)
LYMPHOCYTES NFR BLD: 27 % (ref 21–52)
MCH RBC QN AUTO: 27.8 PG (ref 24–34)
MCHC RBC AUTO-ENTMCNC: 32.8 G/DL (ref 31–37)
MCV RBC AUTO: 84.8 FL (ref 74–97)
MONOCYTES # BLD: 0.9 K/UL (ref 0.05–1.2)
MONOCYTES NFR BLD: 10 % (ref 3–10)
NEUTS SEG # BLD: 5.9 K/UL (ref 1.8–8)
NEUTS SEG NFR BLD: 60 % (ref 40–73)
PLATELET # BLD AUTO: 327 K/UL (ref 135–420)
PMV BLD AUTO: 10.4 FL (ref 9.2–11.8)
POTASSIUM SERPL-SCNC: 3.6 MMOL/L (ref 3.5–5.5)
RBC # BLD AUTO: 5.25 M/UL (ref 4.7–5.5)
SODIUM SERPL-SCNC: 137 MMOL/L (ref 136–145)
WBC # BLD AUTO: 9.8 K/UL (ref 4.6–13.2)

## 2019-07-10 PROCEDURE — 80048 BASIC METABOLIC PNL TOTAL CA: CPT

## 2019-07-10 PROCEDURE — 36415 COLL VENOUS BLD VENIPUNCTURE: CPT

## 2019-07-10 PROCEDURE — 74011250636 HC RX REV CODE- 250/636: Performed by: PHYSICIAN ASSISTANT

## 2019-07-10 PROCEDURE — 96376 TX/PRO/DX INJ SAME DRUG ADON: CPT

## 2019-07-10 PROCEDURE — 85025 COMPLETE CBC W/AUTO DIFF WBC: CPT

## 2019-07-10 PROCEDURE — 99218 HC RM OBSERVATION: CPT

## 2019-07-10 RX ORDER — ATORVASTATIN CALCIUM 40 MG/1
40 TABLET, FILM COATED ORAL
Status: DISCONTINUED | OUTPATIENT
Start: 2019-07-10 | End: 2019-07-10 | Stop reason: HOSPADM

## 2019-07-10 RX ORDER — ATORVASTATIN CALCIUM 40 MG/1
40 TABLET, FILM COATED ORAL
Qty: 30 TAB | Refills: 0 | Status: SHIPPED | OUTPATIENT
Start: 2019-07-10 | End: 2020-01-29 | Stop reason: ALTCHOICE

## 2019-07-10 RX ORDER — FUROSEMIDE 20 MG/1
20 TABLET ORAL AS NEEDED
Qty: 30 TAB | Refills: 0 | Status: SHIPPED | OUTPATIENT
Start: 2019-07-10 | End: 2019-09-04 | Stop reason: SDUPTHER

## 2019-07-10 RX ORDER — FUROSEMIDE 20 MG/1
20 TABLET ORAL DAILY
Status: DISCONTINUED | OUTPATIENT
Start: 2019-07-11 | End: 2019-07-10 | Stop reason: HOSPADM

## 2019-07-10 RX ADMIN — FUROSEMIDE 40 MG: 10 INJECTION, SOLUTION INTRAMUSCULAR; INTRAVENOUS at 09:29

## 2019-07-10 NOTE — PROGRESS NOTES
[facility] - Progress Note  Admit Date: 7/8/2019    I saw, evaluated, interviewed and examined the patient personally. I agree with the findings and plan of care as documented below with PA-C note  Acute new diagnosis of systolic CHF  Now on lasix and Entresto. Improved significantly and now dyspnea resolved. Consider coreg initiation as outpatient to avoid hypotension. Following discussed withpatient in detail.  - Fluid restriction 1.5-1.8 L / Day  - Advised patient for salt restriction in diet. - Sign, symptoms of CHF and diagnosis and management for CHF was discussed with patient and mother at bedside in detail.   - Compliance with medication regiment was advised   Consider sleep apnea study      Korey Lawton MD      Assessment:     - Clinical concern for acute CHF exacerbation with BNP 1132, vascular congestion on CXR without overt pulmonary edema  - Echo 07/09/19 with EF 20%, grade 2 DD  - Elevated blood pressure on admission without official diagnosis of hypertension   - Obesity    Plan:     - Entresto starting tomorrow as he had one dose of Lisinopril first night of admission, 48 hr washout of Lisinopril required prior to initiating  - Consult to care management for Entresto savings card  - Discussed in detail CHF education to include daily weights, salt, fluid restriction, and pursuing outpatient sleep study   - Low BP is limiting the use of Coreg  - Starting low dose PO Lasix at d/c  - Ok for d/c today after one more dose of IV Lasix given this morning  - Will arrange for one week office follow up    Subjective:     No new complaints.  Feels breathing is much better since admission    Objective:      Patient Vitals for the past 8 hrs:   Temp Pulse Resp BP SpO2   07/10/19 0724 97.7 °F (36.5 °C) 94 16 109/69 100 %   07/10/19 0444 98 °F (36.7 °C) 97 16 117/77 95 %   07/10/19 0119 98.1 °F (36.7 °C) 95 18 106/76 96 %         Patient Vitals for the past 96 hrs:   Weight   07/10/19 0444 347 lb 12.8 oz (157.8 kg)   07/09/19 0137 (!) 354 lb 3.2 oz (160.7 kg)   07/08/19 2300 345 lb (156.5 kg)                    Intake/Output Summary (Last 24 hours) at 7/10/2019 0854  Last data filed at 7/10/2019 0724  Gross per 24 hour   Intake 250 ml   Output 2950 ml   Net -2700 ml       Physical Exam:  General:  alert, cooperative, no distress  Neck:  nontender, no JVD  Lungs:  clear to auscultation bilaterally  Heart:  regular rate and rhythm, S1, S2 normal, no murmur, click, rub or gallop  Abdomen:  abdomen is soft without significant tenderness, masses, organomegaly or guarding  Extremities:  extremities normal, atraumatic, no cyanosis or edema    Data Review:     Labs: Results:       Chemistry Recent Labs     07/10/19  0435 07/08/19  2345   GLU 84 93    138   K 3.6 3.6    107   CO2 28 25   BUN 13 12   CREA 1.00 0.95   CA 8.7 9.1   AGAP 7 6   BUCR 13 13   AP  --  98   TP  --  7.3   ALB  --  3.5   GLOB  --  3.8   AGRAT  --  0.9      CBC w/Diff Recent Labs     07/10/19  0435 07/08/19  2345   WBC 9.8 9.9   RBC 5.25 5.03   HGB 14.6 14.2   HCT 44.5 42.3    284   GRANS 60 55   LYMPH 27 35   EOS 3 2      Cardiac Enzymes Lab Results   Component Value Date/Time     07/09/2019 12:00 PM    CKMB 2.0 07/09/2019 12:00 PM    CKND1 1.9 07/09/2019 12:00 PM    TROIQ <0.02 07/09/2019 12:00 PM      Coagulation No results for input(s): PTP, INR, APTT in the last 72 hours.     No lab exists for component: INREXT    Lipid Panel Lab Results   Component Value Date/Time    Cholesterol, total 200 (H) 07/09/2019 12:00 PM    HDL Cholesterol 34 (L) 07/09/2019 12:00 PM    LDL, calculated 135 (H) 07/09/2019 12:00 PM    VLDL, calculated 31 07/09/2019 12:00 PM    Triglyceride 155 (H) 07/09/2019 12:00 PM    CHOL/HDL Ratio 5.9 (H) 07/09/2019 12:00 PM      BNP No results found for: BNP, BNPP, XBNPT   Liver Enzymes Recent Labs     07/08/19  2345   TP 7.3   ALB 3.5   AP 98   SGOT 21      Digoxin    Thyroid Studies No results found for: T4, T3U, TSH, TSHEXT       Signed By: Ej Escamilla.  Pj Dubose     July 10, 2019

## 2019-07-10 NOTE — PROGRESS NOTES
Problem: Discharge Planning  Goal: *Discharge to safe environment  Outcome: Resolved/Met       Home with [address] Management Interventions  PCP Verified by CM: Yes  Palliative Care Criteria Met (RRAT>21 & CHF Dx)?: No  Transition of Care Consult (CM Consult): Home Health([facility] CHF)  [address] [place] [address] Road: Yes  Current Support Network: (mother)  Confirm Follow Up Transport: Family(Uber or Family)  Plan discussed with Pt/Family/Caregiver: Yes  Freedom of Choice Offered: Yes  Discharge Location  Discharge Placement: Home with home health([facility] CHF)       [facility] to fill medications, by [employer] Total $ 17.00  Entresto Free with one month supply free card. Lipitor  $12.00 and Lasix $5.00.

## 2019-07-10 NOTE — PROGRESS NOTES
Discharge instructions reviewed with pt on behalf of primary nurse [facility]. CHF education provided. Pt awaiting prescriptions for lasix, entresto, and lipitor to arrive from the [address].

## 2019-07-10 NOTE — DISCHARGE INSTRUCTIONS
DISCHARGE SUMMARY from Nurse    PATIENT INSTRUCTIONS:    After general anesthesia or intravenous sedation, for 24 hours or while taking prescription Narcotics:  · Limit your activities  · Do not drive and operate hazardous machinery  · Do not make important personal or business decisions  · Do  not drink alcoholic beverages  · If you have not urinated within 8 hours after discharge, please contact your surgeon on call. Report the following to your surgeon:  · Excessive pain, swelling, redness or odor of or around the surgical area  · Temperature over 100.5  · Nausea and vomiting lasting longer than 4 hours or if unable to take medications  · Any signs of decreased circulation or nerve impairment to extremity: change in color, persistent  numbness, tingling, coldness or increase pain  · Any questions    What to do at Home:  Recommended activity: Activity as tolerated,     If you experience any of the following symptoms worsening shortness of breath, palpitation, or swelling in legs/hands, please follow up with primary care physician/cardiologist/emergency room. *  Please give a list of your current medications to your Primary Care Provider. *  Please update this list whenever your medications are discontinued, doses are      changed, or new medications (including over-the-counter products) are added. *  Please carry medication information at all times in case of emergency situations. These are general instructions for a healthy lifestyle:    No smoking/ No tobacco products/ Avoid exposure to second hand smoke  Surgeon General's Warning:  Quitting smoking now greatly reduces serious risk to your health. Obesity, smoking, and sedentary lifestyle greatly increases your risk for illness    A healthy diet, regular physical exercise & weight monitoring are important for maintaining a healthy lifestyle    Fluid restriction to 1.5-1.8 Liters per day.   You may be retaining fluid if you have a history of heart failure or if you experience any of the following symptoms:  Weight gain of 3 pounds or more overnight or 5 pounds in a week, increased swelling in our hands or feet or shortness of breath while lying flat in bed. Please call your doctor as soon as you notice any of these symptoms; do not wait until your next office visit. The discharge information has been reviewed with the patient. The patient verbalized understanding. Discharge medications reviewed with the patient and appropriate educational materials and side effects teaching were provided. Patient armband removed and shredded.

## 2019-07-10 NOTE — DISCHARGE SUMMARY
2 [facility]  Hospitalist Division    Discharge Summary    Patient: Rohini Monson MRN: [id number]  CSN: [id number]    YOB: 1980  Age: 46 y.o. Sex: male    DOA: 7/8/2019 LOS:  LOS: 1 day   Discharge Date: 7/10/2019     Admission Diagnoses: Acute systolic CHF (congestive heart failure) (HCC) [I50.21]  Acute CHF (congestive heart failure) ([facility] [I50.9]    Discharge Diagnoses:    Problem List as of 7/10/2019 Date Reviewed: 7/9/2019          Codes Class Noted - Resolved    * (Principal) Acute combined systolic (congestive) and diastolic (congestive) heart failure (HCC) ICD-10-CM: I50.41  ICD-9-CM: 428.41, 428.0  7/9/2019 - Present        Hyperlipidemia ICD-10-CM: E78.5  ICD-9-CM: 272.4  7/10/2019 - Present        HTN (hypertension) ICD-10-CM: I10  ICD-9-CM: 401.9  7/9/2019 - Present        Morbid obesity ([facility] ICD-10-CM: E66.01  ICD-9-CM: 278.01  7/9/2019 - Present        Acute CHF (congestive heart failure) ([facility] ICD-10-CM: I50.9  ICD-9-CM: 428.0  7/9/2019 - Present              Discharge Condition: Stable    Discharge To: Home    Consults: Cardiology    Hospital Course: Mara Dominguez is a 46 y.o. male who presented to the ER with SOB. He has had this for several months, but it acutely worsened over the past two days. He does not have chest pain. He has dry cough. He has no foot swelling, no syncope. The SOB is worse with activity. He has never had heart disease. In the ER he is found have CHF and will be admitted for ongoing management. \"    ECHO Severe systolic dysfunction EF 45-12%, Moderate (grade 2) left ventricular diastolic dysfunction. Cardiology consulted. Heart medications started. Lasix PRN, coreg to be added and titrated in out patient setting with tolerance of low BP. Patient to follow up with PCP and in Cardiology clinic. Advised about importance of fluid, salt restriction, and weight loss. VSS. Ready for discharge.  1.5-1.8 L fluid restriction. Dyspnea improved. On RA    Physical Exam:  General appearance: alert, cooperative, no distress, appears stated age, obese  Head: Normocephalic, without obvious abnormality, atraumatic  Lungs: clear to auscultation bilaterally  Heart: regular rate and rhythm, S1, S2 normal, no murmur, click, rub or gallop  Abdomen: soft, non-tender. Bowel sounds normal. No masses,  no organomegaly  Extremities: extremities normal, atraumatic, no cyanosis,  Trace edema  Skin: Skin color, texture, turgor normal. No rashes or lesions  Neurologic: Grossly normal  PSY: Mood and affect normal, appropriately behaved    Significant Diagnostic Studies: All lab results for the last 24 hours reviewed. No results found. Discharge Medications:   Cannot display discharge medications since this patient is not currently admitted.       Activity: Activity as tolerated    Diet: Cardiac Diet    Wound Care: None needed    Follow-up: PCP, Cardiology    Discharge time: >35 Minutes     Con Savage 84  Pager: [phone]  Office: [phone]    7/10/2019, 3:12 PM

## 2019-07-10 NOTE — PROGRESS NOTES
Problem: Heart Failure: Day 2  Goal: Activity/Safety  7/9/2019 2044 by Clyde Schwab, RN  Outcome: Progressing Towards Goal  7/9/2019 1459 by Clyde Schwab, RN  Outcome: Progressing Towards Goal  Goal: Consults, if ordered  7/9/2019 2044 by Clyde Schwab, RN  Outcome: Progressing Towards Goal  7/9/2019 1459 by Clyde Schwab, RN  Outcome: Progressing Towards Goal  Goal: Diagnostic Test/Procedures  7/9/2019 2044 by Clyde Schwab, RN  Outcome: Progressing Towards Goal  7/9/2019 1459 by Clyde Schwab, RN  Outcome: Progressing Towards Goal  Goal: Nutrition/Diet  7/9/2019 2044 by Clyde Schwab, RN  Outcome: Progressing Towards Goal  7/9/2019 1459 by Clyde Schwab, RN  Outcome: Progressing Towards Goal  Goal: Discharge Planning  7/9/2019 2044 by Clyde Schwab, RN  Outcome: Progressing Towards Goal  7/9/2019 1459 by Clyde Schwab, RN  Outcome: Progressing Towards Goal  Goal: Medications  7/9/2019 2044 by Clyde Schwab, RN  Outcome: Progressing Towards Goal  7/9/2019 1459 by Clyde Schwab, RN  Outcome: Progressing Towards Goal  Goal: Respiratory  7/9/2019 2044 by Clyde Schwab, RN  Outcome: Progressing Towards Goal  7/9/2019 1459 by Clyde Schwab, RN  Outcome: Progressing Towards Goal  Goal: Treatments/Interventions/Procedures  7/9/2019 2044 by Clyde Schwab, RN  Outcome: Progressing Towards Goal  7/9/2019 1459 by Clyde Schwab, RN  Outcome: Progressing Towards Goal  Goal: Psychosocial  7/9/2019 2044 by Clyde Schwab, RN  Outcome: Progressing Towards Goal  7/9/2019 1459 by Clyde Schwab, RN  Outcome: Progressing Towards Goal  Goal: *Oxygen saturation within defined limits  7/9/2019 2044 by Clyde Schwab, RN  Outcome: Progressing Towards Goal  7/9/2019 1459 by Clyde Schwab, RN  Outcome: Progressing Towards Goal  Goal: *Hemodynamically stable  7/9/2019 2044 by Clyde Schwab, RN  Outcome: Progressing Towards Goal  7/9/2019 1459 by Cristino Carrillo Dejuan BULLARD RN  Outcome: Progressing Towards Goal  Goal: *Optimal pain control at patient's stated goal  7/9/2019 2044 by Venus Ingram RN  Outcome: Progressing Towards Goal  7/9/2019 1459 by Venus Ingram RN  Outcome: Progressing Towards Goal  Goal: *Anxiety reduced or absent  7/9/2019 2044 by Venus Ingram RN  Outcome: Progressing Towards Goal  7/9/2019 1459 by Venus Ingram, RN  Outcome: Progressing Towards Goal  Goal: *Demonstrates progressive activity  7/9/2019 2044 by Venus Ingram RN  Outcome: Progressing Towards Goal  7/9/2019 1459 by Venus Ingram RN  Outcome: Progressing Towards Goal     Problem: Heart Failure: Day 3  Goal: Activity/Safety  7/9/2019 2044 by Venus Ingram RN  Outcome: Progressing Towards Goal  7/9/2019 1459 by Venus Ingram RN  Outcome: Progressing Towards Goal  Goal: Diagnostic Test/Procedures  7/9/2019 2044 by Venus Ingram RN  Outcome: Progressing Towards Goal  7/9/2019 1459 by Venus Ingram RN  Outcome: Progressing Towards Goal  Goal: Nutrition/Diet  7/9/2019 2044 by Venus Ingram, RN  Outcome: Progressing Towards Goal  7/9/2019 1459 by Venus Ingram RN  Outcome: Progressing Towards Goal  Goal: Discharge Planning  7/9/2019 2044 by Venus Ingram RN  Outcome: Progressing Towards Goal  7/9/2019 1459 by Venus Ingram RN  Outcome: Progressing Towards Goal  Goal: Medications  7/9/2019 2044 by Venus Ingram, RN  Outcome: Progressing Towards Goal  7/9/2019 1459 by Venus Ingram, RN  Outcome: Progressing Towards Goal  Goal: Respiratory  7/9/2019 2044 by Venus Ingram, RN  Outcome: Progressing Towards Goal  7/9/2019 1459 by Venus Ingram RN  Outcome: Progressing Towards Goal  Goal: Treatments/Interventions/Procedures  7/9/2019 2044 by Venus Ingram RN  Outcome: Progressing Towards Goal  7/9/2019 1459 by Venus Ingram, RN  Outcome: Progressing Towards Goal  Goal: Psychosocial  7/9/2019 2044 by Jo Balderas J RN  Outcome: Progressing Towards Goal  7/9/2019 1459 by Jassi Sharpe, RN  Outcome: Progressing Towards Goal  Goal: *Oxygen saturation within defined limits  7/9/2019 2044 by Jassi Sharpe RN  Outcome: Progressing Towards Goal  7/9/2019 1459 by Jassi Sharpe, RN  Outcome: Progressing Towards Goal  Goal: *Hemodynamically stable  7/9/2019 2044 by Jassi Sharpe, RN  Outcome: Progressing Towards Goal  7/9/2019 1459 by Jassi Sharpe, RN  Outcome: Progressing Towards Goal  Goal: *Optimal pain control at patient's stated goal  7/9/2019 2044 by Jassi Sharpe, RN  Outcome: Progressing Towards Goal  7/9/2019 1459 by Jassi Sharpe RN  Outcome: Progressing Towards Goal  Goal: *Anxiety reduced or absent  7/9/2019 2044 by Jassi Sharpe, RN  Outcome: Progressing Towards Goal  7/9/2019 1459 by Jassi Sharpe, RN  Outcome: Progressing Towards Goal  Goal: *Demonstrates progressive activity  7/9/2019 2044 by Jassi Sharpe, RN  Outcome: Progressing Towards Goal  7/9/2019 1459 by Jassi Sharpe RN  Outcome: Progressing Towards Goal     Problem: Heart Failure: Day 4  Goal: Off Pathway (Use only if patient is Off Pathway)  7/9/2019 2044 by Jassi Sharpe, RN  Outcome: Progressing Towards Goal  7/9/2019 1459 by Jassi Sharpe RN  Outcome: Progressing Towards Goal  Goal: Activity/Safety  7/9/2019 2044 by Jassi Sharpe, RN  Outcome: Progressing Towards Goal  7/9/2019 1459 by Jassi Sharpe RN  Outcome: Progressing Towards Goal  Goal: Diagnostic Test/Procedures  7/9/2019 2044 by Jassi Sharpe, RN  Outcome: Progressing Towards Goal  7/9/2019 1459 by Jassi Sharpe RN  Outcome: Progressing Towards Goal  Goal: Nutrition/Diet  7/9/2019 2044 by Jassi Sharpe, RN  Outcome: Progressing Towards Goal  7/9/2019 1459 by Jassi Sharpe, RN  Outcome: Progressing Towards Goal  Goal: Discharge Planning  7/9/2019 2044 by Jassi Sharpe RN  Outcome: Progressing Towards Goal  7/9/2019 1459 by Meghan Carcamo RN  Outcome: Progressing Towards Goal  Goal: Medications  7/9/2019 2044 by Meghan Carcamo RN  Outcome: Progressing Towards Goal  7/9/2019 1459 by Meghan Carcamo RN  Outcome: Progressing Towards Goal  Goal: Respiratory  7/9/2019 2044 by Meghan Carcamo RN  Outcome: Progressing Towards Goal  7/9/2019 1459 by Meghan Carcamo RN  Outcome: Progressing Towards Goal  Goal: Treatments/Interventions/Procedures  7/9/2019 2044 by Meghan Carcamo RN  Outcome: Progressing Towards Goal  7/9/2019 1459 by Meghan Carcamo RN  Outcome: Progressing Towards Goal  Goal: Psychosocial  7/9/2019 2044 by Meghan Carcamo RN  Outcome: Progressing Towards Goal  7/9/2019 1459 by Meghan Carcamo RN  Outcome: Progressing Towards Goal  Goal: *Oxygen saturation within defined limits  7/9/2019 2044 by Meghan Carcamo RN  Outcome: Progressing Towards Goal  7/9/2019 1459 by Meghan Carcamo RN  Outcome: Progressing Towards Goal  Goal: *Hemodynamically stable  7/9/2019 2044 by Meghan Carcamo RN  Outcome: Progressing Towards Goal  7/9/2019 1459 by Meghan Carcamo RN  Outcome: Progressing Towards Goal  Goal: *Optimal pain control at patient's stated goal  7/9/2019 2044 by Meghan Carcamo RN  Outcome: Progressing Towards Goal  7/9/2019 1459 by Meghan Carcamo RN  Outcome: Progressing Towards Goal  Goal: *Anxiety reduced or absent  7/9/2019 2044 by Meghan Carcamo RN  Outcome: Progressing Towards Goal  7/9/2019 1459 by Meghan Carcamo RN  Outcome: Progressing Towards Goal  Goal: *Demonstrates progressive activity  7/9/2019 2044 by Meghan Carcamo RN  Outcome: Progressing Towards Goal  7/9/2019 1459 by Meghan Carcamo RN  Outcome: Progressing Towards Goal     Problem: Heart Failure: Day 5  Goal: Activity/Safety  7/9/2019 2044 by Meghan Carcamo RN  Outcome: Progressing Towards Goal  7/9/2019 1459 by Meghan Carcamo RN  Outcome: Progressing Towards Goal  Goal: Diagnostic Test/Procedures  7/9/2019 2044 by Darby العلي RN  Outcome: Progressing Towards Goal  7/9/2019 1459 by Darby العلي RN  Outcome: Progressing Towards Goal  Goal: Nutrition/Diet  7/9/2019 2044 by Darby العلي RN  Outcome: Progressing Towards Goal  7/9/2019 1459 by Darby العلي RN  Outcome: Progressing Towards Goal  Goal: Discharge Planning  7/9/2019 2044 by Darby العلي RN  Outcome: Progressing Towards Goal  7/9/2019 1459 by Darby العلي RN  Outcome: Progressing Towards Goal  Goal: Medications  7/9/2019 2044 by Darby العلي RN  Outcome: Progressing Towards Goal  7/9/2019 1459 by Darby العلي RN  Outcome: Progressing Towards Goal  Goal: Respiratory  7/9/2019 2044 by Darby العلي RN  Outcome: Progressing Towards Goal  7/9/2019 1459 by Darby العلي RN  Outcome: Progressing Towards Goal  Goal: Treatments/Interventions/Procedures  7/9/2019 2044 by Darby العلي RN  Outcome: Progressing Towards Goal  7/9/2019 1459 by Darby العلي RN  Outcome: Progressing Towards Goal  Goal: Psychosocial  7/9/2019 2044 by Darby العلي RN  Outcome: Progressing Towards Goal  7/9/2019 1459 by Darby العلي RN  Outcome: Progressing Towards Goal     Problem: Heart Failure: Discharge Outcomes  Goal: *Demonstrates ability to perform prescribed activity without shortness of breath or discomfort  7/9/2019 2044 by Darby العلي RN  Outcome: Progressing Towards Goal  7/9/2019 1459 by Darby العلي RN  Outcome: Progressing Towards Goal  Goal: *Left ventricular function assessment completed prior to or during stay, or planned for post-discharge  7/9/2019 2044 by Darby العلي RN  Outcome: Progressing Towards Goal  7/9/2019 1459 by Darby العلي RN  Outcome: Progressing Towards Goal  Goal: *ACEI prescribed if LVEF less than 40% and no contraindications or ARB prescribed  7/9/2019 2044 by Darby العلي RN  Outcome: Progressing Towards Goal  7/9/2019 1459 by Patricia Pinzon RN  Outcome: Progressing Towards Goal  Goal: *Verbalizes understanding and describes prescribed diet  7/9/2019 2044 by Patricia Pinzon RN  Outcome: Progressing Towards Goal  7/9/2019 1459 by Patricia Pinzon RN  Outcome: Progressing Towards Goal  Goal: *Verbalizes understanding/describes prescribed medications  7/9/2019 2044 by Patricia Pinzon RN  Outcome: Progressing Towards Goal  7/9/2019 1459 by Patricia Pinzon RN  Outcome: Progressing Towards Goal  Goal: *Describes available resources and support systems  Description  (eg: Home Health, Palliative Care, Advanced Medical Directive)  7/9/2019 2044 by Patricia Pinzon RN  Outcome: Progressing Towards Goal  7/9/2019 1459 by Patricia Pinzon RN  Outcome: Progressing Towards Goal  Goal: *Understands and describes signs and symptoms to report to providers(Stroke Metric)  7/9/2019 2044 by Patricia Pinzon RN  Outcome: Progressing Towards Goal  7/9/2019 1459 by Patricia Pinzon RN  Outcome: Progressing Towards Goal  Goal: *Describes/verbalizes understanding of follow-up/return appt  Description  (eg: to physicians, diabetes treatment coordinator, and other resources  7/9/2019 2044 by Patricia Pinzon RN  Outcome: Progressing Towards Goal  7/9/2019 1459 by Patricia Pinzon RN  Outcome: Progressing Towards Goal  Goal: *Describes importance of continuing daily weights and changes to report to physician  7/9/2019 2044 by Patricia Pinzon RN  Outcome: Progressing Towards Goal  7/9/2019 1459 by Patricia Pinzon RN  Outcome: Progressing Towards Goal     Problem: General Medical Care Plan  Goal: *Vital signs within specified parameters  7/9/2019 2044 by Patricia Pinzon RN  Outcome: Progressing Towards Goal  7/9/2019 1459 by Patricia Pinzon RN  Outcome: Progressing Towards Goal  Goal: *Labs within defined limits  7/9/2019 2044 by Patricia Pinzon RN  Outcome: Progressing Towards Goal  7/9/2019 1459 by Lori Angel RN  Outcome: Progressing Towards Goal  Goal: *Absence of infection signs and symptoms  7/9/2019 2044 by Lori Angel RN  Outcome: Progressing Towards Goal  7/9/2019 1459 by Lori Angel RN  Outcome: Progressing Towards Goal  Goal: *Optimal pain control at patient's stated goal  7/9/2019 2044 by Lori Angel RN  Outcome: Progressing Towards Goal  7/9/2019 1459 by Lori Angel RN  Outcome: Progressing Towards Goal  Goal: *Skin integrity maintained  7/9/2019 2044 by Lori Angel RN  Outcome: Progressing Towards Goal  7/9/2019 1459 by Lori Angel RN  Outcome: Progressing Towards Goal  Goal: *Fluid volume balance  7/9/2019 2044 by Lori Angel RN  Outcome: Progressing Towards Goal  7/9/2019 1459 by Lori Angel RN  Outcome: Progressing Towards Goal  Goal: *Optimize nutritional status  7/9/2019 2044 by Lori Angel RN  Outcome: Progressing Towards Goal  7/9/2019 1459 by Lori Angel RN  Outcome: Progressing Towards Goal  Goal: *Anxiety reduced or absent  7/9/2019 2044 by Lori Angel RN  Outcome: Progressing Towards Goal  7/9/2019 1459 by Lori Angel RN  Outcome: Progressing Towards Goal  Goal: *Progressive mobility and function (eg: ADL's)  7/9/2019 2044 by Lori Angel RN  Outcome: Progressing Towards Goal  7/9/2019 1459 by Lori Angel RN  Outcome: Progressing Towards Goal     Problem: Patient Education: Go to Patient Education Activity  Goal: Patient/Family Education  7/9/2019 2044 by Lori Angel RN  Outcome: Progressing Towards Goal  7/9/2019 1459 by Lori Angel RN  Outcome: Progressing Towards Goal

## 2019-07-11 ENCOUNTER — HOME CARE VISIT (OUTPATIENT)
Dept: HOME HEALTH SERVICES | Facility: HOME HEALTH | Age: 39
End: 2019-07-11

## 2019-07-11 NOTE — ROUTINE PROCESS
0730-report received, call bell within reach, no distress noted. 0930-am due medication given, assessment completed, call bell within reach, no distress noted. 1105-Bedside and Verbal shift change report given to Estela Castro (oncoming nurse) by Joey Patton (offgoing nurse). Report included the following information SBAR, MAR and Recent Results.

## 2019-07-13 ENCOUNTER — TELEPHONE (OUTPATIENT)
Dept: INTERNAL MEDICINE CLINIC | Age: 39
End: 2019-07-13

## 2019-07-15 ENCOUNTER — HOME CARE VISIT (OUTPATIENT)
Dept: SCHEDULING | Facility: HOME HEALTH | Age: 39
End: 2019-07-15

## 2019-07-15 PROCEDURE — G0299 HHS/HOSPICE OF RN EA 15 MIN: HCPCS

## 2019-07-17 ENCOUNTER — TELEPHONE (OUTPATIENT)
Dept: CARDIOLOGY CLINIC | Age: 39
End: 2019-07-17

## 2019-07-17 NOTE — TELEPHONE ENCOUNTER
----- Message from Lali Aparicio.  Justin Ma PA-C sent at 7/10/2019  9:00 AM EDT -----  Will need follow up with WC in 1-2 weeks, x

## 2019-07-26 ENCOUNTER — OFFICE VISIT (OUTPATIENT)
Dept: CARDIOLOGY CLINIC | Age: 39
End: 2019-07-26

## 2019-07-26 VITALS
BODY MASS INDEX: 46.65 KG/M2 | WEIGHT: 315 LBS | HEIGHT: 69 IN | HEART RATE: 79 BPM | OXYGEN SATURATION: 99 % | DIASTOLIC BLOOD PRESSURE: 78 MMHG | SYSTOLIC BLOOD PRESSURE: 123 MMHG

## 2019-07-26 DIAGNOSIS — I10 ESSENTIAL HYPERTENSION: ICD-10-CM

## 2019-07-26 DIAGNOSIS — E78.5 HYPERLIPIDEMIA, UNSPECIFIED HYPERLIPIDEMIA TYPE: ICD-10-CM

## 2019-07-26 DIAGNOSIS — I50.41 ACUTE COMBINED SYSTOLIC (CONGESTIVE) AND DIASTOLIC (CONGESTIVE) HEART FAILURE (HCC): Primary | ICD-10-CM

## 2019-07-26 DIAGNOSIS — E66.01 MORBID OBESITY (HCC): ICD-10-CM

## 2019-07-26 RX ORDER — CARVEDILOL 3.12 MG/1
3.12 TABLET ORAL 2 TIMES DAILY WITH MEALS
Qty: 180 TAB | Refills: 3 | Status: CANCELLED | OUTPATIENT
Start: 2019-07-26

## 2019-07-26 RX ORDER — CARVEDILOL 3.12 MG/1
3.12 TABLET ORAL 2 TIMES DAILY WITH MEALS
Qty: 60 TAB | Refills: 6 | Status: SHIPPED | OUTPATIENT
Start: 2019-07-26 | End: 2019-08-28 | Stop reason: SDUPTHER

## 2019-07-26 NOTE — TELEPHONE ENCOUNTER
PCP: Leonie Love MD    Last appt: 7/26/2019  Future Appointments   Date Time Provider [place]   7/30/2019  7:00 AM Markos Rader MD [facility]       Requested Prescriptions     Pending Prescriptions Disp Refills    carvedilol (COREG) 3.125 mg tablet 60 Tab 6     Sig: Take 1 Tab by mouth two (2) times daily (with meals).

## 2019-07-30 ENCOUNTER — OFFICE VISIT (OUTPATIENT)
Dept: INTERNAL MEDICINE CLINIC | Age: 39
End: 2019-07-30

## 2019-07-30 VITALS
RESPIRATION RATE: 17 BRPM | BODY MASS INDEX: 46.65 KG/M2 | DIASTOLIC BLOOD PRESSURE: 73 MMHG | WEIGHT: 315 LBS | TEMPERATURE: 98.8 F | HEART RATE: 85 BPM | OXYGEN SATURATION: 97 % | SYSTOLIC BLOOD PRESSURE: 106 MMHG | HEIGHT: 69 IN

## 2019-07-30 DIAGNOSIS — E78.5 DYSLIPIDEMIA: ICD-10-CM

## 2019-07-30 DIAGNOSIS — I50.9 CONGESTIVE HEART FAILURE, UNSPECIFIED HF CHRONICITY, UNSPECIFIED HEART FAILURE TYPE (HCC): Primary | ICD-10-CM

## 2019-07-30 NOTE — PROGRESS NOTES
Chief Complaint   Patient presents with    Transitions Of Care         HPI:     Benja Yeager is a 46 y.o.  male with history of CHF here for the above complaint. He denies any chest pain, shortness of breath, abdominal pain, but he headaches or dizziness. He said he has paternal grandfather who had MI. He was eating out of lot and now he has changed his diet. Pt was admitted at [facility] from 7/8/9-7/10/19 for CHF. He had been short of breath for 1 year off and on, but recently was short of breath for 2 days and would not improve. Hospital records were reviewed. Result Information     Status: Final result (Exam End: 7/8/2019 23:39) Provider Status: Open   Study Result     EXAM: XR CHEST PORT     CLINICAL INDICATION/HISTORY: sob    > Additional: None.     COMPARISON: None.     TECHNIQUE: Portable chest     _______________     FINDINGS:     SUPPORT DEVICES: None.     HEART AND MEDIASTINUM: The heart is mildly enlarged. Pulmonary vasculature  mildly engorged without definite pulmonary edema.     LUNGS AND PLEURAL SPACES: Soft tissue attenuation related to body habitus  partially obscures the lung bases. Lungs are grossly clear. No consolidation or  definite effusion.     BONY THORAX AND SOFT TISSUES: No acute osseous abnormality.     _______________     IMPRESSION  IMPRESSION:     Mild cardiomegaly and vascular congestion without overt pulmonary edema or other  acute process. · Left Ventricle: Normal wall thickness. Moderately dilated left ventricle. Severe systolic dysfunction. Estimated left ventricular ejection fraction is 16 - 20%. Visually measured ejection fraction. Moderate (grade 2) left ventricular diastolic dysfunction. · Mitral Valve: Mitral valve non-specific thickening. Mild mitral valve regurgitation. · Right Ventricle: Reduced systolic function. · Left Atrium: Mildly dilated left atrium. Echo Findings     Left Ventricle Normal wall thickness.  Moderately dilated left ventricle. Severe systolic dysfunction. The estimated ejection fraction is 16 - 20%. Visually measured ejection fraction. There is moderate (grade 2) left ventricular diastolic dysfunction. Wall Scoring The left ventricular wall motion is globally hypokinetic. Left Atrium Mildly dilated left atrium. Left Atrium volume index is 34 mL/m2. Right Ventricle Normal cavity size. Reduced systolic function. Right Atrium Normal cavity size. Aortic Valve Trileaflet valve structure and no stenosis. Trace aortic valve regurgitation. Mitral Valve No stenosis. Mitral valve non-specific thickening. Mild regurgitation. Tricuspid Valve Normal valve structure and no stenosis. Tricuspid regurgitation is inadequate for estimation of right ventricular systolic pressure. Pulmonic Valve Normal valve structure, no stenosis and no regurgitation. Aorta Normal aortic root. IVC/Hepatic Veins Normal central venous pressure (0-5 mmHg); IVC diameter is less than 21 mm and collapses more than 50% with respiration. Pericardium No evidence of pericardial effusion. Labs okay except lipids up. He was put on lipitor, entresto and lasix. He has some headaches and dizziness from medications. He saw Dr. Shyanne Johnson, his cardiologist on 7/26/19 and was put on coreg 3.125mg one po bid. Past Medical History:   Diagnosis Date    Congestive heart failure ([address]        History reviewed. No pertinent surgical history. MEDICATION ALLERGIES/INTOLERANCES:   No Known Allergies          CURRENT MEDICATIONS:    Current Outpatient Medications   Medication Sig    carvedilol (COREG) 3.125 mg tablet Take 1 Tab by mouth two (2) times daily (with meals).  atorvastatin (LIPITOR) 40 mg tablet Take 1 Tab by mouth nightly.  furosemide (LASIX) 20 mg tablet Take 1 Tab by mouth as needed (peripheral swelling).  sacubitril-valsartan (ENTRESTO) 24 mg/26 mg tablet Take 1 Tab by mouth every twelve (12) hours.      No current facility-administered medications for this visit. Health Maintenance   Topic Date Due    Pneumococcal 0-64 years (1 of 1 - PPSV23) 1987    DTaP/Tdap/Td series (1 - Tdap) 2002    Influenza Age 5 to Adult  2019         FAMILY HISTORY:   Family History   Problem Relation Age of Onset   Welby Bimler Glaucoma Mother     Other Mother     Hypertension Father     No Known Problems Sister     No Known Problems Brother     No Known Problems Sister     Heart Disease Paternal Grandmother          of MI       SOCIAL HISTORY:   He  reports that he has never smoked. He has never used smokeless tobacco.  He  reports that he does not drink alcohol. OBJECTIVE:  PHYSICAL EXAM: Vitals:   Vitals:    19 0721   BP: 106/73   Pulse: 85   Resp: 17   Temp: 98.8 °F (37.1 °C)   TempSrc: Oral   SpO2: 97%   Weight: 338 lb 12.8 oz (153.7 kg)   Height: 5' 9\" (1.753 m)     Exam:   Generally: Pleasant male in no acute distress    Cardiac exam: regular, rate, and rhythm. No murmurs, gallops, or rubs. Normal S1 and S2.    Pulmonary exam: Clear to ausculation bilaterally    Abdominal exam: Positive bowel sounds in all four quadrants. Soft. Nondistended. Nontender. No hepatosplenomegaly. Extremities: 2+ dorsalis pedis bilaterally. No pedal edema bilaterally.          LABS/RADIOLOGICAL TESTS:   Component      Latest Ref Rng & Units 7/10/2019 7/10/2019 2019 2019           4:35 AM  4:35 AM 12:00 PM 12:00 PM   WBC      4.6 - 13.2 K/uL  9.8     RBC      4.70 - 5.50 M/uL  5.25     HGB      13.0 - 16.0 g/dL  14.6     HCT      36.0 - 48.0 %  44.5     MCV      74.0 - 97.0 FL  84.8     MCH      24.0 - 34.0 PG  27.8     MCHC      31.0 - 37.0 g/dL  32.8     RDW      11.6 - 14.5 %  12.9     PLATELET      836 - 852 K/uL  327     MPV      9.2 - 11.8 FL  10.4     NEUTROPHILS      40 - 73 %  60     LYMPHOCYTES      21 - 52 %  27     MONOCYTES      3 - 10 %  10     EOSINOPHILS      0 - 5 %  3     BASOPHILS      0 - 2 %  0     ABS. NEUTROPHILS      1.8 - 8.0 K/UL  5.9     ABS. LYMPHOCYTES      0.9 - 3.6 K/UL  2.7     ABS. MONOCYTES      0.05 - 1.2 K/UL  0.9     ABS. EOSINOPHILS      0.0 - 0.4 K/UL  0.3     ABS. BASOPHILS      0.0 - 0.1 K/UL  0.0     DF        AUTOMATED     Sodium      136 - 145 mmol/L 137      Potassium      3.5 - 5.5 mmol/L 3.6      Chloride      100 - 108 mmol/L 102      CO2      21 - 32 mmol/L 28      Anion gap      3.0 - 18 mmol/L 7      Glucose      74 - 99 mg/dL 84      BUN      7.0 - 18 MG/DL 13      Creatinine      0.6 - 1.3 MG/DL 1.00      BUN/Creatinine ratio      12 - 20   13      GFR est AA      >60 ml/min/1.73m2 >60      GFR est non-AA      >60 ml/min/1.73m2 >60      Calcium      8.5 - 10.1 MG/DL 8.7      Bilirubin, total      0.2 - 1.0 MG/DL       ALT (SGPT)      16 - 61 U/L       AST      15 - 37 U/L       Alk.  phosphatase      45 - 117 U/L       Protein, total      6.4 - 8.2 g/dL       Albumin      3.4 - 5.0 g/dL       Globulin      2.0 - 4.0 g/dL       A-G Ratio      0.8 - 1.7         Cholesterol, total      <200 MG/DL   200 (H)    Triglyceride      <150 MG/DL   155 (H)    HDL Cholesterol      40 - 60 MG/DL   34 (L)    LDL, calculated      0 - 100 MG/DL   135 (H)    VLDL, calculated      MG/DL   31    CHOL/HDL Ratio      0 - 5.0     5.9 (H)    CK      39 - 308 U/L    108   CK - MB      <3.6 ng/ml    2.0   CK-MB Index      0.0 - 4.0 %    1.9   Troponin-I, Qt.      0.0 - 0.045 NG/ML    <0.02   NT pro-BNP      0 - 450 PG/ML       D DIMER      <0.46 ug/ml(FEU)         Component      Latest Ref Rng & Units 7/9/2019 7/8/2019 7/8/2019 7/8/2019           6:10 AM 11:45 PM 11:45 PM 11:45 PM   WBC      4.6 - 13.2 K/uL       RBC      4.70 - 5.50 M/uL       HGB      13.0 - 16.0 g/dL       HCT      36.0 - 48.0 %       MCV      74.0 - 97.0 FL       MCH      24.0 - 34.0 PG       MCHC      31.0 - 37.0 g/dL       RDW      11.6 - 14.5 %       PLATELET      135 - 530 K/uL       MPV      9.2 - 11.8 FL NEUTROPHILS      40 - 73 %       LYMPHOCYTES      21 - 52 %       MONOCYTES      3 - 10 %       EOSINOPHILS      0 - 5 %       BASOPHILS      0 - 2 %       ABS. NEUTROPHILS      1.8 - 8.0 K/UL       ABS. LYMPHOCYTES      0.9 - 3.6 K/UL       ABS. MONOCYTES      0.05 - 1.2 K/UL       ABS. EOSINOPHILS      0.0 - 0.4 K/UL       ABS. BASOPHILS      0.0 - 0.1 K/UL       DF             Sodium      136 - 145 mmol/L    138   Potassium      3.5 - 5.5 mmol/L    3.6   Chloride      100 - 108 mmol/L    107   CO2      21 - 32 mmol/L    25   Anion gap      3.0 - 18 mmol/L    6   Glucose      74 - 99 mg/dL    93   BUN      7.0 - 18 MG/DL    12   Creatinine      0.6 - 1.3 MG/DL    0.95   BUN/Creatinine ratio      12 - 20      13   GFR est AA      >60 ml/min/1.73m2    >60   GFR est non-AA      >60 ml/min/1.73m2    >60   Calcium      8.5 - 10.1 MG/DL    9.1   Bilirubin, total      0.2 - 1.0 MG/DL    0.6   ALT (SGPT)      16 - 61 U/L    38   AST      15 - 37 U/L    21   Alk.  phosphatase      45 - 117 U/L    98   Protein, total      6.4 - 8.2 g/dL    7.3   Albumin      3.4 - 5.0 g/dL    3.5   Globulin      2.0 - 4.0 g/dL    3.8   A-G Ratio      0.8 - 1.7      0.9   Cholesterol, total      <200 MG/DL       Triglyceride      <150 MG/DL       HDL Cholesterol      40 - 60 MG/DL       LDL, calculated      0 - 100 MG/DL       VLDL, calculated      MG/DL       CHOL/HDL Ratio      0 - 5.0         CK      39 - 308 U/L 110      CK - MB      <3.6 ng/ml 2.1      CK-MB Index      0.0 - 4.0 % 1.9      Troponin-I, Qt.      0.0 - 0.045 NG/ML 0.02 <0.02     NT pro-BNP      0 - 450 PG/ML   1,132 (H)    D DIMER      <0.46 ug/ml(FEU)         Component      Latest Ref Rng & Units 7/8/2019 7/8/2019          11:45 PM 11:45 PM   WBC      4.6 - 13.2 K/uL  9.9   RBC      4.70 - 5.50 M/uL  5.03   HGB      13.0 - 16.0 g/dL  14.2   HCT      36.0 - 48.0 %  42.3   MCV      74.0 - 97.0 FL  84.1   MCH      24.0 - 34.0 PG  28.2   MCHC      31.0 - 37.0 g/dL  33.6 RDW      11.6 - 14.5 %  13.0   PLATELET      312 - 243 K/uL  284   MPV      9.2 - 11.8 FL  10.2   NEUTROPHILS      40 - 73 %  55   LYMPHOCYTES      21 - 52 %  35   MONOCYTES      3 - 10 %  8   EOSINOPHILS      0 - 5 %  2   BASOPHILS      0 - 2 %  0   ABS. NEUTROPHILS      1.8 - 8.0 K/UL  5.5   ABS. LYMPHOCYTES      0.9 - 3.6 K/UL  3.5   ABS. MONOCYTES      0.05 - 1.2 K/UL  0.8   ABS. EOSINOPHILS      0.0 - 0.4 K/UL  0.2   ABS. BASOPHILS      0.0 - 0.1 K/UL  0.0   DF        AUTOMATED   Sodium      136 - 145 mmol/L     Potassium      3.5 - 5.5 mmol/L     Chloride      100 - 108 mmol/L     CO2      21 - 32 mmol/L     Anion gap      3.0 - 18 mmol/L     Glucose      74 - 99 mg/dL     BUN      7.0 - 18 MG/DL     Creatinine      0.6 - 1.3 MG/DL     BUN/Creatinine ratio      12 - 20       GFR est AA      >60 ml/min/1.73m2     GFR est non-AA      >60 ml/min/1.73m2     Calcium      8.5 - 10.1 MG/DL     Bilirubin, total      0.2 - 1.0 MG/DL     ALT (SGPT)      16 - 61 U/L     AST      15 - 37 U/L     Alk. phosphatase      45 - 117 U/L     Protein, total      6.4 - 8.2 g/dL     Albumin      3.4 - 5.0 g/dL     Globulin      2.0 - 4.0 g/dL     A-G Ratio      0.8 - 1.7       Cholesterol, total      <200 MG/DL     Triglyceride      <150 MG/DL     HDL Cholesterol      40 - 60 MG/DL     LDL, calculated      0 - 100 MG/DL     VLDL, calculated      MG/DL     CHOL/HDL Ratio      0 - 5.0       CK      39 - 308 U/L     CK - MB      <3.6 ng/ml     CK-MB Index      0.0 - 4.0 %     Troponin-I, Qt.      0.0 - 0.045 NG/ML     NT pro-BNP      0 - 450 PG/ML     D DIMER      <0.46 ug/ml(FEU) 0.33    Previous labs          ASSESSMENT/PLAN:      ICD-10-CM ICD-9-CM    1. Congestive heart failure, unspecified HF chronicity, unspecified heart failure type ([facility] [address] I50.9 428.0 Stable. Being followed by Dr. Wanda Erwin. Continue lasix prn, entresto, coreg, and liptior.     2. Dyslipidemia R16.9 899.8 METABOLIC PANEL, COMPREHENSIVE      LIPID PANEL  We will recheck levels in 6 weeks to see if lipitor is helping. 3. Patient verbalized understanding and agreement with the plan. 4. Patient was given an after visit summary. 5.   Follow-up and Dispositions    · Return in about 6 weeks (around 9/10/2019) for f/u lipids/CHF or sooner if worsening symptoms.                 Andie Francis MD

## 2019-07-30 NOTE — PROGRESS NOTES
ROOM # 1  Identified pt with two pt identifiers(name and ). Reviewed record in preparation for visit and have obtained necessary documentation. Chief Complaint   Patient presents with   [address] preferred language for health care discussion is english/other. Is the patient using any DME equipment during OV? NO    Herberttaisha Traciisreal is due for:  Health Maintenance Due   Topic    Pneumococcal 0-64 years (1 of 1 - PPSV23)    DTaP/Tdap/Td series (1 - Tdap)     Health Maintenance reviewed and discussed per provider  Please order/place referral if appropriate. Advance Directive:  1. Do you have an advance directive in place? Patient Reply: NO    2. If not, would you like material regarding how to put one in place? NO    Coordination of Care:  1. Have you been to the ER, urgent care clinic since your last visit? Hospitalized since your last visit? YES [facility] -7/10 for Chf    2. Have you seen or consulted any other health care providers outside of the [address] [place] [address] since your last visit? Include any pap smears or colon screening. NO    Patient is accompanied by self I have received verbal consent from Dane Sapp to discuss any/all medical information while they are present in the room.     Learning Assessment:  Learning Assessment 2019   PRIMARY LEARNER Patient   HIGHEST LEVEL OF EDUCATION - PRIMARY LEARNER  GRADUATED HIGH SCHOOL OR GED   BARRIERS PRIMARY LEARNER NONE   PRIMARY LANGUAGE ENGLISH   LEARNER PREFERENCE PRIMARY READING     VIDEOS   ANSWERED BY pt   RELATIONSHIP SELF     Depression Screening:  3 most recent PHQ Screens 2019   Little interest or pleasure in doing things Not at all   Feeling down, depressed, irritable, or hopeless Not at all   Total Score PHQ 2 0     Abuse Screening:  n/i  Fall Risk  n/i

## 2019-08-19 NOTE — TELEPHONE ENCOUNTER
Sent electronically:    Requested Prescriptions     Signed Prescriptions Disp Refills    sacubitril-valsartan (ENTRESTO) 24 mg/26 mg tablet 180 Tab 3     Sig: Take 1 Tab by mouth every twelve (12) hours.      Authorizing Provider: Vanda Mccall

## 2019-08-27 ENCOUNTER — APPOINTMENT (OUTPATIENT)
Dept: GENERAL RADIOLOGY | Age: 39
End: 2019-08-27
Attending: EMERGENCY MEDICINE
Payer: SUBSIDIZED

## 2019-08-27 ENCOUNTER — HOSPITAL ENCOUNTER (EMERGENCY)
Age: 39
Discharge: HOME | End: 2019-08-27
Attending: EMERGENCY MEDICINE | Admitting: EMERGENCY MEDICINE
Payer: SUBSIDIZED

## 2019-08-27 VITALS
RESPIRATION RATE: 21 BRPM | TEMPERATURE: 98.1 F | BODY MASS INDEX: 48.73 KG/M2 | OXYGEN SATURATION: 97 % | HEART RATE: 87 BPM | WEIGHT: 315 LBS | DIASTOLIC BLOOD PRESSURE: 90 MMHG | SYSTOLIC BLOOD PRESSURE: 117 MMHG

## 2019-08-27 DIAGNOSIS — I50.9 CHRONIC CONGESTIVE HEART FAILURE, UNSPECIFIED HEART FAILURE TYPE (HCC): Primary | ICD-10-CM

## 2019-08-27 LAB
ALBUMIN SERPL-MCNC: 3.4 G/DL (ref 3.4–5)
ALBUMIN/GLOB SERPL: 0.8 {RATIO} (ref 0.8–1.7)
ALP SERPL-CCNC: 116 U/L (ref 45–117)
ALT SERPL-CCNC: 34 U/L (ref 16–61)
ANION GAP SERPL CALC-SCNC: 5 MMOL/L (ref 3–18)
AST SERPL-CCNC: 16 U/L (ref 10–38)
ATRIAL RATE: 97 BPM
BASOPHILS # BLD: 0 K/UL (ref 0–0.1)
BASOPHILS NFR BLD: 0 % (ref 0–2)
BILIRUB SERPL-MCNC: 0.5 MG/DL (ref 0.2–1)
BNP SERPL-MCNC: 572 PG/ML (ref 0–450)
BUN SERPL-MCNC: 11 MG/DL (ref 7–18)
BUN/CREAT SERPL: 13 (ref 12–20)
CALCIUM SERPL-MCNC: 8.5 MG/DL (ref 8.5–10.1)
CALCULATED P AXIS, ECG09: 39 DEGREES
CALCULATED R AXIS, ECG10: 14 DEGREES
CALCULATED T AXIS, ECG11: 34 DEGREES
CHLORIDE SERPL-SCNC: 108 MMOL/L (ref 100–111)
CO2 SERPL-SCNC: 25 MMOL/L (ref 21–32)
CREAT SERPL-MCNC: 0.82 MG/DL (ref 0.6–1.3)
DIAGNOSIS, 93000: NORMAL
DIFFERENTIAL METHOD BLD: ABNORMAL
EOSINOPHIL # BLD: 0.3 K/UL (ref 0–0.4)
EOSINOPHIL NFR BLD: 3 % (ref 0–5)
ERYTHROCYTE [DISTWIDTH] IN BLOOD BY AUTOMATED COUNT: 12.8 % (ref 11.6–14.5)
GLOBULIN SER CALC-MCNC: 4.3 G/DL (ref 2–4)
GLUCOSE SERPL-MCNC: 80 MG/DL (ref 74–99)
HCT VFR BLD AUTO: 44.1 % (ref 36–48)
HGB BLD-MCNC: 14.8 G/DL (ref 13–16)
LYMPHOCYTES # BLD: 3.9 K/UL (ref 0.9–3.6)
LYMPHOCYTES NFR BLD: 38 % (ref 21–52)
MCH RBC QN AUTO: 28.4 PG (ref 24–34)
MCHC RBC AUTO-ENTMCNC: 33.6 G/DL (ref 31–37)
MCV RBC AUTO: 84.6 FL (ref 74–97)
MONOCYTES # BLD: 0.9 K/UL (ref 0.05–1.2)
MONOCYTES NFR BLD: 8 % (ref 3–10)
NEUTS SEG # BLD: 5.3 K/UL (ref 1.8–8)
NEUTS SEG NFR BLD: 51 % (ref 40–73)
P-R INTERVAL, ECG05: 150 MS
PLATELET # BLD AUTO: 324 K/UL (ref 135–420)
PMV BLD AUTO: 10.4 FL (ref 9.2–11.8)
POTASSIUM SERPL-SCNC: 3.7 MMOL/L (ref 3.5–5.5)
PROT SERPL-MCNC: 7.7 G/DL (ref 6.4–8.2)
Q-T INTERVAL, ECG07: 372 MS
QRS DURATION, ECG06: 86 MS
QTC CALCULATION (BEZET), ECG08: 472 MS
RBC # BLD AUTO: 5.21 M/UL (ref 4.7–5.5)
SODIUM SERPL-SCNC: 138 MMOL/L (ref 136–145)
TROPONIN I SERPL-MCNC: <0.02 NG/ML (ref 0–0.04)
VENTRICULAR RATE, ECG03: 97 BPM
WBC # BLD AUTO: 10.3 K/UL (ref 4.6–13.2)

## 2019-08-27 PROCEDURE — 74011250636 HC RX REV CODE- 250/636

## 2019-08-27 PROCEDURE — 93005 ELECTROCARDIOGRAM TRACING: CPT

## 2019-08-27 PROCEDURE — 71045 X-RAY EXAM CHEST 1 VIEW: CPT

## 2019-08-27 PROCEDURE — 84484 ASSAY OF TROPONIN QUANT: CPT

## 2019-08-27 PROCEDURE — 83880 ASSAY OF NATRIURETIC PEPTIDE: CPT

## 2019-08-27 PROCEDURE — 80053 COMPREHEN METABOLIC PANEL: CPT

## 2019-08-27 PROCEDURE — 99285 EMERGENCY DEPT VISIT HI MDM: CPT

## 2019-08-27 PROCEDURE — 74011250637 HC RX REV CODE- 250/637: Performed by: EMERGENCY MEDICINE

## 2019-08-27 PROCEDURE — 96374 THER/PROPH/DIAG INJ IV PUSH: CPT

## 2019-08-27 PROCEDURE — 85025 COMPLETE CBC W/AUTO DIFF WBC: CPT

## 2019-08-27 RX ORDER — FUROSEMIDE 10 MG/ML
20 INJECTION INTRAMUSCULAR; INTRAVENOUS
Status: COMPLETED | OUTPATIENT
Start: 2019-08-27 | End: 2019-08-27

## 2019-08-27 RX ORDER — FUROSEMIDE 10 MG/ML
INJECTION INTRAMUSCULAR; INTRAVENOUS
Status: COMPLETED
Start: 2019-08-27 | End: 2019-08-27

## 2019-08-27 RX ORDER — CARVEDILOL 12.5 MG/1
6.25 TABLET ORAL
Status: COMPLETED | OUTPATIENT
Start: 2019-08-27 | End: 2019-08-27

## 2019-08-27 RX ADMIN — FUROSEMIDE 20 MG: 10 INJECTION INTRAMUSCULAR; INTRAVENOUS at 01:42

## 2019-08-27 RX ADMIN — CARVEDILOL 6.25 MG: 12.5 TABLET, FILM COATED ORAL at 01:37

## 2019-08-27 RX ADMIN — FUROSEMIDE 20 MG: 10 INJECTION, SOLUTION INTRAMUSCULAR; INTRAVENOUS at 01:42

## 2019-08-27 NOTE — DISCHARGE INSTRUCTIONS

## 2019-08-27 NOTE — ED PROVIDER NOTES
Dane Sapp is a 46 y.o. Male with history of congestive heart failure with complaints of increased shortness of breath all day today. Patient states he has been compliant with medications and does get refill of 1 of them today. He denies any chest pain. Symptoms are exacerbated with activity and laying flat. He denies any productive cough. There is no new swelling in his legs. He has no fever. The history is provided by the patient and medical records. Past Medical History:   Diagnosis Date    Congestive heart failure ([facility] [address]        History reviewed. No pertinent surgical history.       Family History:   Problem Relation Age of Onset   Birgit Alonso Glaucoma Mother     Other Mother     Hypertension Father     No Known Problems Sister     No Known Problems Brother     No Known Problems Sister     Heart Disease Paternal Grandmother          of MI       Social History     Socioeconomic History    Marital status: SINGLE     Spouse name: Not on file    Number of children: Not on file    Years of education: Not on file    Highest education level: Not on file   Occupational History    Not on file   Social Needs    Financial resource strain: Not on file    Food insecurity:     Worry: Not on file     Inability: Not on file    Transportation needs:     Medical: Not on file     Non-medical: Not on file   Tobacco Use    Smoking status: Never Smoker    Smokeless tobacco: Never Used   Substance and Sexual Activity    Alcohol use: Never     Frequency: Never    Drug use: Never    Sexual activity: Not Currently   Lifestyle    Physical activity:     Days per week: Not on file     Minutes per session: Not on file    Stress: Not on file   Relationships    Social connections:     Talks on phone: Not on file     Gets together: Not on file     Attends Mu-ism service: Not on file     Active member of club or organization: Not on file     Attends meetings of clubs or organizations: Not on file Relationship status: Not on file    Intimate partner violence:     Fear of current or ex partner: Not on file     Emotionally abused: Not on file     Physically abused: Not on file     Forced sexual activity: Not on file   Other Topics Concern    Not on file   Social History Narrative    Not on file         ALLERGIES: Patient has no known allergies. Review of Systems   Constitutional: Negative for fever. HENT: Negative for sore throat. Eyes: Negative for visual disturbance. Respiratory: Positive for shortness of breath. Negative for chest tightness and wheezing. Cardiovascular: Negative for chest pain and leg swelling. Gastrointestinal: Negative for abdominal pain. Genitourinary: Negative for difficulty urinating. Musculoskeletal: Negative for gait problem. Skin: Negative for rash. Allergic/Immunologic: Negative for immunocompromised state. Neurological: Negative for syncope. Psychiatric/Behavioral: Positive for sleep disturbance. Vitals:    08/27/19 0153 08/27/19 0200 08/27/19 0215 08/27/19 0230   BP:  131/86 (!) 155/108 117/90   Pulse: 90 88 (!) 108 87   Resp: 22 20 23 21   Temp:       SpO2: 97% 97% 97% 97%   Weight:                Physical Exam   Constitutional: He is oriented to person, place, and time. He appears well-developed and well-nourished. Non-toxic appearance. He does not have a sickly appearance. He does not appear ill. No distress. HENT:   Head: Normocephalic and atraumatic. Right Ear: External ear normal.   Left Ear: External ear normal.   Nose: Nose normal.   Mouth/Throat: Oropharynx is clear and moist. No oropharyngeal exudate. Eyes: Conjunctivae are normal.   Neck: Normal range of motion. Cardiovascular: Normal rate, regular rhythm, normal heart sounds and intact distal pulses. Pulmonary/Chest: Effort normal. No accessory muscle usage. No tachypnea. No respiratory distress. He has no decreased breath sounds. He has no wheezes. He has no rhonchi.  He has rales. Abdominal: Soft. There is no tenderness. Musculoskeletal: Normal range of motion. He exhibits no edema. Neurological: He is alert and oriented to person, place, and time. Skin: Skin is warm and dry. Capillary refill takes less than 2 seconds. He is not diaphoretic. Psychiatric: His behavior is normal.   Nursing note and vitals reviewed.        MDM       Procedures  Vitals:  Patient Vitals for the past 12 hrs:   Temp Pulse Resp BP SpO2   08/27/19 0230  87 21 117/90 97 %   08/27/19 0215  (!) 108 23 (!) 155/108 97 %   08/27/19 0200  88 20 131/86 97 %   08/27/19 0153  90 22  97 %   08/27/19 0147   22     08/27/19 0145  87 18 127/86 97 %   08/27/19 0142  89  137/81    08/27/19 0137  95  126/81    08/27/19 0133  97 26 137/81 97 %   08/27/19 0028 98.1 °F (36.7 °C) 95 18 (!) 151/105 97 %         Medications ordered:   Medications   carvedilol (COREG) tablet 6.25 mg (6.25 mg Oral Given 8/27/19 0137)   furosemide (LASIX) injection 20 mg (20 mg IntraVENous Given 8/27/19 0142)         Lab findings:  Recent Results (from the past 12 hour(s))   EKG, 12 LEAD, INITIAL    Collection Time: 08/27/19 12:33 AM   Result Value Ref Range    Ventricular Rate 97 BPM    Atrial Rate 97 BPM    P-R Interval 150 ms    QRS Duration 86 ms    Q-T Interval 372 ms    QTC Calculation (Bezet) 472 ms    Calculated P Axis 39 degrees    Calculated R Axis 14 degrees    Calculated T Axis 34 degrees    Diagnosis       Normal sinus rhythm  Nonspecific T wave abnormality  Prolonged QT  Abnormal ECG  When compared with ECG of 08-JUL-2019 23:07,  No significant change was found     NT-PRO BNP    Collection Time: 08/27/19  1:36 AM   Result Value Ref Range    NT pro- (H) 0 - 450 PG/ML   TROPONIN I    Collection Time: 08/27/19  1:36 AM   Result Value Ref Range    Troponin-I, QT <0.02 0.0 - 0.045 NG/ML   CBC WITH AUTOMATED DIFF    Collection Time: 08/27/19  1:36 AM   Result Value Ref Range    WBC 10.3 4.6 - 13.2 K/uL    RBC 5. 21 4.70 - 5.50 M/uL    HGB 14.8 13.0 - 16.0 g/dL    HCT 44.1 36.0 - 48.0 %    MCV 84.6 74.0 - 97.0 FL    MCH 28.4 24.0 - 34.0 PG    MCHC 33.6 31.0 - 37.0 g/dL    RDW 12.8 11.6 - 14.5 %    PLATELET 680 557 - 683 K/uL    MPV 10.4 9.2 - 11.8 FL    NEUTROPHILS 51 40 - 73 %    LYMPHOCYTES 38 21 - 52 %    MONOCYTES 8 3 - 10 %    EOSINOPHILS 3 0 - 5 %    BASOPHILS 0 0 - 2 %    ABS. NEUTROPHILS 5.3 1.8 - 8.0 K/UL    ABS. LYMPHOCYTES 3.9 (H) 0.9 - 3.6 K/UL    ABS. MONOCYTES 0.9 0.05 - 1.2 K/UL    ABS. EOSINOPHILS 0.3 0.0 - 0.4 K/UL    ABS. BASOPHILS 0.0 0.0 - 0.1 K/UL    DF AUTOMATED     METABOLIC PANEL, COMPREHENSIVE    Collection Time: 08/27/19  1:36 AM   Result Value Ref Range    Sodium 138 136 - 145 mmol/L    Potassium 3.7 3.5 - 5.5 mmol/L    Chloride 108 100 - 111 mmol/L    CO2 25 21 - 32 mmol/L    Anion gap 5 3.0 - 18 mmol/L    Glucose 80 74 - 99 mg/dL    BUN 11 7.0 - 18 MG/DL    Creatinine 0.82 0.6 - 1.3 MG/DL    BUN/Creatinine ratio 13 12 - 20      GFR est AA >60 >60 ml/min/1.73m2    GFR est non-AA >60 >60 ml/min/1.73m2    Calcium 8.5 8.5 - 10.1 MG/DL    Bilirubin, total 0.5 0.2 - 1.0 MG/DL    ALT (SGPT) 34 16 - 61 U/L    AST (SGOT) 16 10 - 38 U/L    Alk. phosphatase 116 45 - 117 U/L    Protein, total 7.7 6.4 - 8.2 g/dL    Albumin 3.4 3.4 - 5.0 g/dL    Globulin 4.3 (H) 2.0 - 4.0 g/dL    A-G Ratio 0.8 0.8 - 1.7         EKG interpretation by ED Physician:  Normal sinus rhythm. Nonspecific T wave abnormality. No acute ST changes  Rate 97 QRS 86 \  No significant change from previous    Cardiac monitor: Normal rate with regular rhythm and no ectopy  Pulse ox: 97% room      X-Ray, CT or other radiology findings or impressions:  XR CHEST PORT    (Results Pending)   Cardiomegaly with no acute process per my interpretation    Progress notes, Consult notes or additional Procedure notes:   Patient with chronic CHF with mild exacerbation. Do not feel patient requires medication adjustment or admission.   He states he probably did too much today. Discussed patient he is a very low ejection fractions and will likely be short of breath all the time more so than others on some days    I have discussed with patient and/or family/sig other the results, interpretation of any imaging if performed, suspected diagnosis and treatment plan to include instructions regarding the diagnoses listed to which understanding was expressed with all questions answered          Reevaluation of patient:   stable    Disposition:  Diagnosis:   1. Chronic congestive heart failure, unspecified heart failure type ([address]        Disposition: home    Follow-up Information     Follow up With Specialties Details Why Contact Info    Arash Broja MD Cardiology, Internal Medicine Schedule an appointment as soon as possible for a visit  [facility] [address]  [facility]  [facility]  [phone]      [facility] EMERGENCY DEPT Emergency Medicine  If symptoms worsen [address]  [phone]            Patient's Medications   Start Taking    No medications on file   Continue Taking    ATORVASTATIN (LIPITOR) 40 MG TABLET    Take 1 Tab by mouth nightly. CARVEDILOL (COREG) 3.125 MG TABLET    Take 1 Tab by mouth two (2) times daily (with meals). FUROSEMIDE (LASIX) 20 MG TABLET    Take 1 Tab by mouth as needed (peripheral swelling). SACUBITRIL-VALSARTAN (ENTRESTO) 24 MG/26 MG TABLET    Take 1 Tab by mouth every twelve (12) hours.    These Medications have changed    No medications on file   Stop Taking    No medications on file

## 2019-08-28 RX ORDER — CARVEDILOL 3.12 MG/1
3.12 TABLET ORAL 2 TIMES DAILY WITH MEALS
Qty: 60 TAB | Refills: 6 | Status: SHIPPED | OUTPATIENT
Start: 2019-08-28 | End: 2019-10-06 | Stop reason: SDUPTHER

## 2019-08-30 NOTE — TELEPHONE ENCOUNTER
From: Jessica Dhaliwal  Sent: 8/28/2019 9:28 AM EDT  Subject: Medication Renewal Request    Jessica Dhaliwal would like a refill of the following medications:    Other - Good morning, need a refill of the lasix    Preferred pharmacy: [facility] [address] [place] [address] - [employer]      Medication renewals requested in this message routed separately:     carvedilol (COREG) 3.125 mg tablet Erich Henderson MD]

## 2019-09-04 RX ORDER — FUROSEMIDE 20 MG/1
20 TABLET ORAL AS NEEDED
Qty: 30 TAB | Refills: 1 | Status: SHIPPED | OUTPATIENT
Start: 2019-09-04 | End: 2019-09-06 | Stop reason: SDUPTHER

## 2019-09-06 RX ORDER — FUROSEMIDE 20 MG/1
20 TABLET ORAL AS NEEDED
Qty: 30 TAB | Refills: 1 | Status: SHIPPED | OUTPATIENT
Start: 2019-09-06 | End: 2019-11-26 | Stop reason: SDUPTHER

## 2019-09-06 NOTE — TELEPHONE ENCOUNTER
From: Jeramy Zarate  Sent: 9/3/2019 8:38 AM EDT  Subject: Medication Renewal Request    Jeramy Zarate would like a refill of the following medications:    Other - Requesting refill of lasix to [facility] on [address]    Preferred pharmacy: Other - [facility] on [address]

## 2019-09-06 NOTE — TELEPHONE ENCOUNTER
Sent electronically:    Requested Prescriptions     Signed Prescriptions Disp Refills    furosemide (LASIX) 20 mg tablet 30 Tab 1     Sig: Take 1 Tab by mouth as needed (peripheral swelling). Authorizing Provider: Sabino Israel     On 9/4/19 sent to [facility] in [place], [place] which was in pt's chart.

## 2019-10-07 RX ORDER — CARVEDILOL 3.12 MG/1
3.12 TABLET ORAL 2 TIMES DAILY WITH MEALS
Qty: 60 TAB | Refills: 6 | Status: SHIPPED | OUTPATIENT
Start: 2019-10-07 | End: 2020-01-31 | Stop reason: SDUPTHER

## 2019-10-13 ENCOUNTER — APPOINTMENT (OUTPATIENT)
Dept: GENERAL RADIOLOGY | Age: 39
End: 2019-10-13
Attending: EMERGENCY MEDICINE
Payer: SUBSIDIZED

## 2019-10-13 ENCOUNTER — HOSPITAL ENCOUNTER (EMERGENCY)
Age: 39
Discharge: HOME | End: 2019-10-13
Attending: EMERGENCY MEDICINE
Payer: SUBSIDIZED

## 2019-10-13 VITALS
SYSTOLIC BLOOD PRESSURE: 171 MMHG | DIASTOLIC BLOOD PRESSURE: 105 MMHG | RESPIRATION RATE: 13 BRPM | TEMPERATURE: 98.4 F | HEART RATE: 99 BPM | OXYGEN SATURATION: 100 %

## 2019-10-13 DIAGNOSIS — R10.13 ABDOMINAL PAIN, EPIGASTRIC: Primary | ICD-10-CM

## 2019-10-13 LAB
ALBUMIN SERPL-MCNC: 3.5 G/DL (ref 3.4–5)
ALBUMIN/GLOB SERPL: 0.9 {RATIO} (ref 0.8–1.7)
ALP SERPL-CCNC: 99 U/L (ref 45–117)
ALT SERPL-CCNC: 47 U/L (ref 16–61)
ANION GAP SERPL CALC-SCNC: 4 MMOL/L (ref 3–18)
AST SERPL-CCNC: 18 U/L (ref 10–38)
ATRIAL RATE: 89 BPM
BASOPHILS # BLD: 0 K/UL (ref 0–0.1)
BASOPHILS NFR BLD: 0 % (ref 0–2)
BILIRUB SERPL-MCNC: 1.1 MG/DL (ref 0.2–1)
BNP SERPL-MCNC: 313 PG/ML (ref 0–450)
BUN SERPL-MCNC: 8 MG/DL (ref 7–18)
BUN/CREAT SERPL: 10 (ref 12–20)
CALCIUM SERPL-MCNC: 8.8 MG/DL (ref 8.5–10.1)
CALCULATED P AXIS, ECG09: 52 DEGREES
CALCULATED R AXIS, ECG10: 11 DEGREES
CALCULATED T AXIS, ECG11: 26 DEGREES
CHLORIDE SERPL-SCNC: 110 MMOL/L (ref 100–111)
CO2 SERPL-SCNC: 26 MMOL/L (ref 21–32)
CREAT SERPL-MCNC: 0.81 MG/DL (ref 0.6–1.3)
DIAGNOSIS, 93000: NORMAL
DIFFERENTIAL METHOD BLD: NORMAL
EOSINOPHIL # BLD: 0.1 K/UL (ref 0–0.4)
EOSINOPHIL NFR BLD: 1 % (ref 0–5)
ERYTHROCYTE [DISTWIDTH] IN BLOOD BY AUTOMATED COUNT: 13.4 % (ref 11.6–14.5)
GLOBULIN SER CALC-MCNC: 4 G/DL (ref 2–4)
GLUCOSE SERPL-MCNC: 87 MG/DL (ref 74–99)
HCT VFR BLD AUTO: 45 % (ref 36–48)
HGB BLD-MCNC: 15.1 G/DL (ref 13–16)
LIPASE SERPL-CCNC: 90 U/L (ref 73–393)
LYMPHOCYTES # BLD: 2.5 K/UL (ref 0.9–3.6)
LYMPHOCYTES NFR BLD: 25 % (ref 21–52)
MAGNESIUM SERPL-MCNC: 2.2 MG/DL (ref 1.6–2.6)
MCH RBC QN AUTO: 28.8 PG (ref 24–34)
MCHC RBC AUTO-ENTMCNC: 33.6 G/DL (ref 31–37)
MCV RBC AUTO: 85.9 FL (ref 74–97)
MONOCYTES # BLD: 1 K/UL (ref 0.05–1.2)
MONOCYTES NFR BLD: 10 % (ref 3–10)
NEUTS SEG # BLD: 6.4 K/UL (ref 1.8–8)
NEUTS SEG NFR BLD: 64 % (ref 40–73)
P-R INTERVAL, ECG05: 158 MS
PLATELET # BLD AUTO: 308 K/UL (ref 135–420)
PMV BLD AUTO: 9.9 FL (ref 9.2–11.8)
POTASSIUM SERPL-SCNC: 4.1 MMOL/L (ref 3.5–5.5)
PROT SERPL-MCNC: 7.5 G/DL (ref 6.4–8.2)
Q-T INTERVAL, ECG07: 338 MS
QRS DURATION, ECG06: 86 MS
QTC CALCULATION (BEZET), ECG08: 411 MS
RBC # BLD AUTO: 5.24 M/UL (ref 4.7–5.5)
SODIUM SERPL-SCNC: 140 MMOL/L (ref 136–145)
TROPONIN I SERPL-MCNC: <0.02 NG/ML (ref 0–0.04)
VENTRICULAR RATE, ECG03: 89 BPM
WBC # BLD AUTO: 10.1 K/UL (ref 4.6–13.2)

## 2019-10-13 PROCEDURE — 93005 ELECTROCARDIOGRAM TRACING: CPT

## 2019-10-13 PROCEDURE — 99284 EMERGENCY DEPT VISIT MOD MDM: CPT

## 2019-10-13 PROCEDURE — 83880 ASSAY OF NATRIURETIC PEPTIDE: CPT

## 2019-10-13 PROCEDURE — 83735 ASSAY OF MAGNESIUM: CPT

## 2019-10-13 PROCEDURE — 83690 ASSAY OF LIPASE: CPT

## 2019-10-13 PROCEDURE — 80053 COMPREHEN METABOLIC PANEL: CPT

## 2019-10-13 PROCEDURE — 84484 ASSAY OF TROPONIN QUANT: CPT

## 2019-10-13 PROCEDURE — 85025 COMPLETE CBC W/AUTO DIFF WBC: CPT

## 2019-10-13 PROCEDURE — 71045 X-RAY EXAM CHEST 1 VIEW: CPT

## 2019-10-13 RX ORDER — OMEPRAZOLE 40 MG/1
40 CAPSULE, DELAYED RELEASE ORAL DAILY
Qty: 14 CAP | Refills: 0 | Status: SHIPPED | OUTPATIENT
Start: 2019-10-13 | End: 2019-10-27

## 2019-10-13 NOTE — DISCHARGE INSTRUCTIONS

## 2019-10-13 NOTE — ED PROVIDER NOTES
EMERGENCY DEPARTMENT HISTORY AND PHYSICAL EXAM    11:55 AM      Date: 10/13/2019  Patient Name: Donovan Miramontes    History of Presenting Illness     Chief Complaint   Patient presents with    Chest Pain    Abdominal Pain         History Provided By: Patient and Patient's Mother      Additional History (Context): Donovan Miramontes is a 46 y.o. male who presents with intermittent epigastric pain. Department stating over the last few weeks he has had a weight loss with decreased appetite and epigastric to left upper quadrant pain occasionally rating up to his throat. He denies any nausea or vomiting he denies any shortness of breath increased chest pain fevers or chills. Patient states that he has no pain at present but was concerned and wanted to be checked out. Social history is remarkable for occasional alcohol use he denies any recreational drug use. He has had no previous abdominal surgeries. PCP: Tigist Andrade MD      Current Outpatient Medications   Medication Sig Dispense Refill    omeprazole (PRILOSEC) 40 mg capsule Take 1 Cap by mouth daily for 14 days. 14 Cap 0    carvedilol (COREG) 3.125 mg tablet Take 1 Tab by mouth two (2) times daily (with meals). 60 Tab 6    furosemide (LASIX) 20 mg tablet Take 1 Tab by mouth as needed (peripheral swelling). 30 Tab 1    sacubitril-valsartan (ENTRESTO) 24 mg/26 mg tablet Take 1 Tab by mouth every twelve (12) hours. 180 Tab 3    atorvastatin (LIPITOR) 40 mg tablet Take 1 Tab by mouth nightly. 30 Tab 0       Past History     Past Medical History:  Past Medical History:   Diagnosis Date    Congestive heart failure ([address]        Past Surgical History:  History reviewed. No pertinent surgical history.     Family History:  Family History   Problem Relation Age of Onset   Vinay Kindrogelio Glaucoma Mother     Other Mother     Hypertension Father     No Known Problems Sister     No Known Problems Brother     No Known Problems Sister     Heart Disease Paternal Grandmother  of MI       Social History:  Social History     Tobacco Use    Smoking status: Never Smoker    Smokeless tobacco: Never Used   Substance Use Topics    Alcohol use: Never     Frequency: Never    Drug use: Never       Allergies:  No Known Allergies      Review of Systems       Review of Systems   Constitutional: Positive for appetite change and unexpected weight change. Negative for chills and fever. Respiratory: Negative for shortness of breath. Cardiovascular: Negative for chest pain. Gastrointestinal: Positive for abdominal pain and nausea. Negative for vomiting. Skin: Negative for rash. Neurological: Negative for dizziness, syncope and weakness. All other systems reviewed and are negative. Physical Exam     Visit Vitals  BP (!) 169/104   Pulse 97   Temp 98.4 °F (36.9 °C)   Resp (!) 116   SpO2 100%       Physical Exam   Constitutional: He is oriented to person, place, and time. He appears well-developed and well-nourished. No distress. Nontoxic-appearing pleasant gentleman, morbidly obese   HENT:   Head: Normocephalic and atraumatic. Mouth/Throat: Oropharynx is clear and moist.   Eyes: Pupils are equal, round, and reactive to light. Conjunctivae and EOM are normal. No scleral icterus. Neck: Normal range of motion. Neck supple. Cardiovascular: Normal rate, regular rhythm and normal heart sounds. No murmur heard. Pulmonary/Chest: Effort normal and breath sounds normal. No respiratory distress. Abdominal: Soft. Bowel sounds are normal. He exhibits no distension. There is no tenderness. No right upper quadrant tenderness no abdominal tenderness patient denies abdominal pain at present   Musculoskeletal: Normal range of motion. He exhibits no edema. Lymphadenopathy:     He has no cervical adenopathy. Neurological: He is alert and oriented to person, place, and time. Coordination normal.   Skin: Skin is warm and dry. No rash noted.    Psychiatric: He has a normal mood and affect. His behavior is normal.   Nursing note and vitals reviewed. Diagnostic Study Results     Labs -  Recent Results (from the past 12 hour(s))   EKG, 12 LEAD, INITIAL    Collection Time: 10/13/19 11:12 AM   Result Value Ref Range    Ventricular Rate 89 BPM    Atrial Rate 89 BPM    P-R Interval 158 ms    QRS Duration 86 ms    Q-T Interval 338 ms    QTC Calculation (Bezet) 411 ms    Calculated P Axis 52 degrees    Calculated R Axis 11 degrees    Calculated T Axis 26 degrees    Diagnosis       Normal sinus rhythm  Possible Left atrial enlargement  Nonspecific T wave abnormality  Abnormal ECG  When compared with ECG of 27-AUG-2019 00:33,  QT has shortened     CBC WITH AUTOMATED DIFF    Collection Time: 10/13/19 12:19 PM   Result Value Ref Range    WBC 10.1 4.6 - 13.2 K/uL    RBC 5.24 4.70 - 5.50 M/uL    HGB 15.1 13.0 - 16.0 g/dL    HCT 45.0 36.0 - 48.0 %    MCV 85.9 74.0 - 97.0 FL    MCH 28.8 24.0 - 34.0 PG    MCHC 33.6 31.0 - 37.0 g/dL    RDW 13.4 11.6 - 14.5 %    PLATELET 079 685 - 735 K/uL    MPV 9.9 9.2 - 11.8 FL    NEUTROPHILS 64 40 - 73 %    LYMPHOCYTES 25 21 - 52 %    MONOCYTES 10 3 - 10 %    EOSINOPHILS 1 0 - 5 %    BASOPHILS 0 0 - 2 %    ABS. NEUTROPHILS 6.4 1.8 - 8.0 K/UL    ABS. LYMPHOCYTES 2.5 0.9 - 3.6 K/UL    ABS. MONOCYTES 1.0 0.05 - 1.2 K/UL    ABS. EOSINOPHILS 0.1 0.0 - 0.4 K/UL    ABS.  BASOPHILS 0.0 0.0 - 0.1 K/UL    DF AUTOMATED     METABOLIC PANEL, COMPREHENSIVE    Collection Time: 10/13/19 12:19 PM   Result Value Ref Range    Sodium 140 136 - 145 mmol/L    Potassium 4.1 3.5 - 5.5 mmol/L    Chloride 110 100 - 111 mmol/L    CO2 26 21 - 32 mmol/L    Anion gap 4 3.0 - 18 mmol/L    Glucose 87 74 - 99 mg/dL    BUN 8 7.0 - 18 MG/DL    Creatinine 0.81 0.6 - 1.3 MG/DL    BUN/Creatinine ratio 10 (L) 12 - 20      GFR est AA >60 >60 ml/min/1.73m2    GFR est non-AA >60 >60 ml/min/1.73m2    Calcium 8.8 8.5 - 10.1 MG/DL    Bilirubin, total 1.1 (H) 0.2 - 1.0 MG/DL    ALT (SGPT) 47 16 - 61 U/L    AST (SGOT) 18 10 - 38 U/L    Alk. phosphatase 99 45 - 117 U/L    Protein, total 7.5 6.4 - 8.2 g/dL    Albumin 3.5 3.4 - 5.0 g/dL    Globulin 4.0 2.0 - 4.0 g/dL    A-G Ratio 0.9 0.8 - 1.7     TROPONIN I    Collection Time: 10/13/19 12:19 PM   Result Value Ref Range    Troponin-I, QT <0.02 0.0 - 0.045 NG/ML   NT-PRO BNP    Collection Time: 10/13/19 12:19 PM   Result Value Ref Range    NT pro- 0 - 450 PG/ML   MAGNESIUM    Collection Time: 10/13/19 12:19 PM   Result Value Ref Range    Magnesium 2.2 1.6 - 2.6 mg/dL   LIPASE    Collection Time: 10/13/19 12:19 PM   Result Value Ref Range    Lipase 90 73 - 393 U/L       Radiologic Studies -   XR CHEST PORT   Final Result   IMPRESSION:      No acute findings in the chest.             Medical Decision Making   I am the first provider for this patient. I reviewed the vital signs, available nursing notes, past medical history, past surgical history, family history and social history. Vital Signs-Reviewed the patient's vital signs.       EKG: Normal sinus rhythm at a rate of 89 with nonspecific ST-T wave changes read by me on 1204    Records Reviewed: Nursing Notes and Old Medical Records (Time of Review: 11:55 AM)    ED Course: Progress Notes, Reevaluation, and Consults:      Provider Notes (Medical Decision Making):   MDM  Number of Diagnoses or Management Options  Abdominal pain, epigastric:   Diagnosis management comments: Morbidly obese however complaining of intermittent abdominal pain weight loss will check for new onset diabetes although no evidence of DKA at present possible mild biliary colic, pancreatitis patient does not have any evidence of decompensated congestive heart failure during this exam    12:53 PM  Patient's been stable in the emergency department labs reviewed will start PPI and GI follow-up referral       Amount and/or Complexity of Data Reviewed  Clinical lab tests: ordered and reviewed  Independent visualization of images, tracings, or specimens: yes    Risk of Complications, Morbidity, and/or Mortality  Presenting problems: high  Diagnostic procedures: moderate  Management options: moderate            Critical Care Time:       Diagnosis     Clinical Impression:   1. Abdominal pain, epigastric        Disposition: home    Follow-up Information     Follow up With Specialties Details Why [facility] EMERGENCY DEPT Emergency Medicine  As needed, If symptoms worsen [address]    Tigist Andrade MD Internal Medicine Schedule an appointment as soon as possible for a visit for ED follow up appointment  [address] [address] [phone]      Geovanni Pavon MD Gastroenterology, Internal Medicine Schedule an appointment as soon as possible for a visit for ED follow up appointment  [address]  [address] [id number]  [phone]             Patient's Medications   Start Taking    OMEPRAZOLE (PRILOSEC) 40 MG CAPSULE    Take 1 Cap by mouth daily for 14 days. Continue Taking    ATORVASTATIN (LIPITOR) 40 MG TABLET    Take 1 Tab by mouth nightly. CARVEDILOL (COREG) 3.125 MG TABLET    Take 1 Tab by mouth two (2) times daily (with meals). FUROSEMIDE (LASIX) 20 MG TABLET    Take 1 Tab by mouth as needed (peripheral swelling). SACUBITRIL-VALSARTAN (ENTRESTO) 24 MG/26 MG TABLET    Take 1 Tab by mouth every twelve (12) hours. These Medications have changed    No medications on file   Stop Taking    No medications on file     _______________________________    Please note that this dictation was completed with [employer], the computer voice recognition software. Quite often unanticipated grammatical, syntax, homophones, and other interpretive errors are inadvertently transcribed by the computer software. Please disregard these errors. Please excuse any errors that have escaped final proofreading.

## 2019-11-26 RX ORDER — CARVEDILOL 3.12 MG/1
3.12 TABLET ORAL 2 TIMES DAILY WITH MEALS
Qty: 60 TAB | Refills: 6 | Status: CANCELLED | OUTPATIENT
Start: 2019-11-26

## 2019-11-26 RX ORDER — FUROSEMIDE 20 MG/1
20 TABLET ORAL AS NEEDED
Qty: 30 TAB | Refills: 1 | Status: SHIPPED | OUTPATIENT
Start: 2019-11-26 | End: 2019-12-03 | Stop reason: SDUPTHER

## 2019-11-27 ENCOUNTER — PATIENT MESSAGE (OUTPATIENT)
Dept: CARDIOLOGY CLINIC | Age: 39
End: 2019-11-27

## 2019-12-03 RX ORDER — FUROSEMIDE 20 MG/1
TABLET ORAL
Qty: 30 TAB | Refills: 0 | Status: SHIPPED | OUTPATIENT
Start: 2019-12-03 | End: 2020-02-09

## 2020-01-29 ENCOUNTER — OFFICE VISIT (OUTPATIENT)
Dept: CARDIOLOGY CLINIC | Age: 40
End: 2020-01-29

## 2020-01-29 VITALS
BODY MASS INDEX: 46.81 KG/M2 | OXYGEN SATURATION: 97 % | SYSTOLIC BLOOD PRESSURE: 114 MMHG | HEART RATE: 103 BPM | DIASTOLIC BLOOD PRESSURE: 74 MMHG | WEIGHT: 315 LBS

## 2020-01-29 DIAGNOSIS — I11.9 HYPERTENSIVE HEART DISEASE WITHOUT HEART FAILURE: Primary | ICD-10-CM

## 2020-01-29 RX ORDER — LISINOPRIL 5 MG/1
5 TABLET ORAL
Qty: 30 TAB | Refills: 6 | Status: SHIPPED | OUTPATIENT
Start: 2020-01-29 | End: 2020-03-30 | Stop reason: SDUPTHER

## 2020-01-29 NOTE — PROGRESS NOTES
1. Have you been to the ER, urgent care clinic since your last visit? Hospitalized since your last visit? No    2. Have you seen or consulted any other health care providers outside of the [address] [place] [address] since your last visit? Include any pap smears or colon screening.  No

## 2020-01-29 NOTE — PATIENT INSTRUCTIONS
Start Lisinopril 5mg at night     Echo to be done in about 5 weeks- prior to next visit     Please call central scheduling at [phone]      All testing is completed at [address] [place] [address]

## 2020-01-30 NOTE — PROGRESS NOTES
Subjective:      Dewey Reagan is in the office today for cardiac re-evaluation. He is a 21-year-old man that was  hospitalized at [facility] for congestive heart failure. He presented to the emergency department with complaints of shortness of breath that had worsened in the prior 48 hours. Echocardiogram demonstrated severe systolic dysfunction with ejection fraction of 16 to 20%. There was grade 2 diastolic dysfunction. He was started on diuretics and Entresto and discharged for follow-up. In the office on 8/27/2019, he said that he  had no specific problems. His breathing had been good. He  had no peripheral swelling. He also said he \"feels a little more normal \". He was started on metoprolol carvedilol 3.125 twice daily. He was already taking Entresto 24/26 twice daily and furosemide 20 mg daily. He was to return in 6 weeks. For unclear reasons, he is now back 6 months later. He stopped Entresto because he felt terrible while taking it he reports being seen in the emergency department almost monthly in the last several months. In the office today he denies shortness of breath at rest.  He has had no PND or orthopnea. He has had no palpitations, near-syncope or syncope. Patient Active Problem List    Diagnosis Date Noted    Hyperlipidemia 07/10/2019    Acute combined systolic (congestive) and diastolic (congestive) heart failure ([facility] 07/09/2019    HTN (hypertension) 07/09/2019    Morbid obesity ([facility] 07/09/2019    Acute CHF (congestive heart failure) (HCC) 07/09/2019     Current Outpatient Medications   Medication Sig Dispense Refill    furosemide (LASIX) 20 mg tablet TAKE 1 TABLET BY MOUTH AS NEEDED( PERIPHERAL SWELLING) 30 Tab 0    carvedilol (COREG) 3.125 mg tablet Take 1 Tab by mouth two (2) times daily (with meals). 60 Tab 6    lisinopril (PRINIVIL, ZESTRIL) 5 mg tablet Take 1 Tab by mouth nightly.  30 Tab 6     No Known Allergies  Past Medical History:   Diagnosis Date    Congestive heart failure ([address]      History reviewed. No pertinent surgical history. Family History   Problem Relation Age of Onset   Job Gilpin Glaucoma Mother     Other Mother     Hypertension Father     No Known Problems Sister     No Known Problems Brother     No Known Problems Sister     Heart Disease Paternal Grandmother          of MI     Social History     Tobacco Use   Smoking Status Never Smoker   Smokeless Tobacco Never Used          Review of Systems, additional:  Constitutional: negative  Eyes: negative  Respiratory: negative  Cardiovascular: negative  Gastrointestinal: negative  Musculoskeletal:negative  Neurological: negative  Behvioral/Psych: negative  Endocrine: negative  ENT: negative    Objective:     Visit Vitals  /74   Pulse (!) 103   Wt 143.8 kg (317 lb)   SpO2 97%   BMI 46.81 kg/m²     General:  alert, cooperative, no distress   Chest Wall: inspection normal - no chest wall deformities or tenderness, respiratory effort normal   Lung: clear to auscultation bilaterally   Heart:  normal rate and regular rhythm, S1 and S2 normal, no murmurs noted, no gallops noted, no JVD   Abdomen: soft, non-tender. Bowel sounds normal. No masses,  no organomegaly   Extremities: extremities normal, atraumatic, no cyanosis or edema Skin: no rashes   Neuro: alert, oriented, normal speech, no focal findings or movement disorder noted     EK2019. Sinus tachycardia. Diffuse nondiagnostic ST and T wave abnormalities    Assessment/Plan:       ICD-10-CM ICD-9-CM    1. Acute combined systolic (congestive) and diastolic (congestive) heart failure ([address] symptoms stabilized. Adherent to low-sodium and fluids. Add carvedilol 3.125 twice daily. He was to return in 6 weeks but did not keep his appointment. He could not tolerate Entresto. We will add lisinopril 5 mg p.o. nightly and repeat echo prior to his next visit in 6 weeks. Discussed the seriousness of his diagnosis with him today at length.  I50.41 428.41 428.0    2. Essential hypertension, BP controlled in the office today I10 401.9    3. Hyperlipidemia, unspecified hyperlipidemia type E78.5 272.4    4.  Morbid obesity ([facility] E66.01 278.01

## 2020-02-03 RX ORDER — CARVEDILOL 3.12 MG/1
3.12 TABLET ORAL 2 TIMES DAILY WITH MEALS
Qty: 60 TAB | Refills: 6 | Status: SHIPPED | OUTPATIENT
Start: 2020-02-03 | End: 2020-03-30 | Stop reason: SDUPTHER

## 2020-02-09 RX ORDER — FUROSEMIDE 20 MG/1
TABLET ORAL
Qty: 30 TAB | Refills: 0 | Status: SHIPPED | OUTPATIENT
Start: 2020-02-09

## 2020-03-03 RX ORDER — FUROSEMIDE 20 MG/1
TABLET ORAL
Qty: 30 TAB | Refills: 0 | OUTPATIENT
Start: 2020-03-03

## 2020-03-11 ENCOUNTER — HOSPITAL ENCOUNTER (OUTPATIENT)
Dept: NON INVASIVE DIAGNOSTICS | Age: 40
Discharge: HOME | End: 2020-03-11
Attending: INTERNAL MEDICINE
Payer: SELF-PAY

## 2020-03-11 VITALS
HEIGHT: 69 IN | WEIGHT: 315 LBS | BODY MASS INDEX: 46.65 KG/M2 | DIASTOLIC BLOOD PRESSURE: 74 MMHG | SYSTOLIC BLOOD PRESSURE: 114 MMHG

## 2020-03-11 DIAGNOSIS — I11.9 HYPERTENSIVE HEART DISEASE WITHOUT HEART FAILURE: ICD-10-CM

## 2020-03-11 LAB
ECHO AO ROOT DIAM: 2.94 CM
ECHO IVC SNIFF: 1.61 CM
ECHO LA MAJOR AXIS: 5.09 CM
ECHO LA TO AORTIC ROOT RATIO: 1.73
ECHO LV EDV A2C: 334 ML
ECHO LV EDV A4C: 251.5 ML
ECHO LV EDV BP: 291.7 ML (ref 67–155)
ECHO LV EDV INDEX A4C: 100.1 ML/M2
ECHO LV EDV INDEX BP: 116.1 ML/M2
ECHO LV EDV NDEX A2C: 132.9 ML/M2
ECHO LV EDV TEICHHOLZ: 92.51 ML
ECHO LV ESV A2C: 234.1 ML
ECHO LV ESV A4C: 189.2 ML
ECHO LV ESV BP: 213.2 ML (ref 22–58)
ECHO LV ESV INDEX A2C: 93.2 ML/M2
ECHO LV ESV INDEX A4C: 75.3 ML/M2
ECHO LV ESV INDEX BP: 84.9 ML/M2
ECHO LV ESV TEICHHOLZ: 78.5 ML
ECHO LV INTERNAL DIMENSION DIASTOLIC MMODE: 7.14 CM
ECHO LV INTERNAL DIMENSION DIASTOLIC: 6.87 CM (ref 4.2–5.9)
ECHO LV INTERNAL DIMENSION SYSTOLIC: 6.39 CM
ECHO LV IVSD MMODE: 1.15 CM
ECHO LV IVSD: 1.11 CM (ref 0.6–1)
ECHO LV MASS 2D: 436 G (ref 88–224)
ECHO LV MASS INDEX 2D: 173.5 G/M2 (ref 49–115)
ECHO LV POSTERIOR WALL DIASTOLIC MMODE: 1.35 CM
ECHO LV POSTERIOR WALL DIASTOLIC: 1.13 CM (ref 0.6–1)
ECHO LVOT DIAM: 2.32 CM
ECHO LVOT PEAK GRADIENT: 1.8 MMHG
ECHO LVOT PEAK VELOCITY: 66.2 CM/S
ECHO LVOT SV: 54.7 ML
ECHO LVOT VTI: 12.93 CM
ECHO MV A VELOCITY: 48.18 CM/S
ECHO MV AREA PHT: 5.3 CM2
ECHO MV E DECELERATION TIME (DT): 142.2 MS
ECHO MV E VELOCITY: 137.22 CM/S
ECHO MV E/A RATIO: 2.85
ECHO MV PRESSURE HALF TIME (PHT): 41.2 MS
ECHO RA MINOR AXIS: 4.07 CM
ECHO TV REGURGITANT MAX VELOCITY: 185.16 CM/S
ECHO TV REGURGITANT PEAK GRADIENT: 13.7 MMHG
LVFS 2D: 6.99 %
LVOT MG: 0.88 MMHG
LVOT MV: 0.44 CM/S
LVSV (MOD BI): 29.68 ML
LVSV (MOD SINGLE 4C): 23.54 ML
LVSV (MOD SINGLE): 37.76 ML
LVSV (TEICH): 14.01 ML
MV DEC SLOPE: 9.65

## 2020-03-11 PROCEDURE — 93306 TTE W/DOPPLER COMPLETE: CPT

## 2020-03-27 ENCOUNTER — OFFICE VISIT (OUTPATIENT)
Dept: CARDIOLOGY CLINIC | Age: 40
End: 2020-03-27

## 2020-03-27 VITALS
OXYGEN SATURATION: 98 % | BODY MASS INDEX: 48.29 KG/M2 | DIASTOLIC BLOOD PRESSURE: 78 MMHG | HEART RATE: 100 BPM | SYSTOLIC BLOOD PRESSURE: 125 MMHG | WEIGHT: 315 LBS

## 2020-03-27 DIAGNOSIS — I50.42 CHRONIC COMBINED SYSTOLIC AND DIASTOLIC CONGESTIVE HEART FAILURE (HCC): Primary | ICD-10-CM

## 2020-03-31 RX ORDER — CARVEDILOL 3.12 MG/1
3.12 TABLET ORAL 2 TIMES DAILY WITH MEALS
Qty: 60 TAB | Refills: 6 | Status: SHIPPED | OUTPATIENT
Start: 2020-03-31 | End: 2020-04-14 | Stop reason: SDUPTHER

## 2020-03-31 RX ORDER — LISINOPRIL 5 MG/1
5 TABLET ORAL
Qty: 30 TAB | Refills: 6 | Status: SHIPPED | OUTPATIENT
Start: 2020-03-31 | End: 2020-04-14 | Stop reason: SDUPTHER

## 2020-03-31 NOTE — PROGRESS NOTES
Subjective:      Titus Postal is in the office today for cardiac re-evaluation. He is a 28-year-old man that was  hospitalized at [facility] in July 2019 for congestive heart failure. He presented to the emergency department with complaints of shortness of breath that had worsened in the prior 48 hours. Echocardiogram demonstrated severe systolic dysfunction with ejection fraction of 16 to 20%. There was grade 2 diastolic dysfunction. He was started on diuretics and Entresto and discharged for follow-up. In the office on 8/27/2019, he said that he  had no specific problems. His breathing had been good. He  had no peripheral swelling. He also said he \"feels a little more normal \". He was started on carvedilol 3.125 twice daily. He was already taking Entresto 24/26 twice daily and furosemide 20 mg daily. He was to return in 6 weeks. For unclear reasons, he was not seen for another 6 months. He stopped Entresto because he felt terrible while taking it. He reported being seen in the emergency department almost monthly in the prior several months. In the office today he reports no increasing shortness of breath. He said no PND orthopnea. He says that his heart feels like it is \"restarting \"from time to time. He believes that this has been more frequent since the start of his lisinopril      Patient Active Problem List    Diagnosis Date Noted    Hyperlipidemia 07/10/2019    Acute combined systolic (congestive) and diastolic (congestive) heart failure ([facility] 07/09/2019    HTN (hypertension) 07/09/2019    Morbid obesity ([facility] 07/09/2019    Acute CHF (congestive heart failure) (HCC) 07/09/2019     Current Outpatient Medications   Medication Sig Dispense Refill    furosemide (LASIX) 20 mg tablet TAKE 1 TABLET BY MOUTH EVERY DAY AS NEEDED FOR PERIPHERAL SWELLING 30 Tab 0    carvediloL (COREG) 3.125 mg tablet Take 1 Tab by mouth two (2) times daily (with meals).  60 Tab 6    lisinopriL (PRINIVIL, ZESTRIL) 5 mg tablet Take 1 Tab by mouth nightly. 30 Tab 6     No Known Allergies  Past Medical History:   Diagnosis Date    Congestive heart failure ([address]      History reviewed. No pertinent surgical history. Family History   Problem Relation Age of Onset   [address] [facility] [address] Glaucoma Mother     Other Mother     Hypertension Father     No Known Problems Sister     No Known Problems Brother     No Known Problems Sister     Heart Disease Paternal Grandmother          of MI     Social History     Tobacco Use   Smoking Status Never Smoker   Smokeless Tobacco Never Used          Review of Systems, additional:  Constitutional: negative  Eyes: negative  Respiratory: negative  Cardiovascular: negative  Gastrointestinal: negative  Musculoskeletal:negative  Neurological: negative  Behvioral/Psych: negative  Endocrine: negative  ENT: negative    Objective:     Visit Vitals  /78   Pulse 100   Wt 148.3 kg (327 lb)   SpO2 98%   BMI 48.29 kg/m²     General:  alert, cooperative, no distress   Chest Wall: inspection normal - no chest wall deformities or tenderness, respiratory effort normal   Lung: clear to auscultation bilaterally   Heart:  normal rate and regular rhythm, S1 and S2 normal, no murmurs noted, no gallops noted, no JVD   Abdomen: soft, non-tender. Bowel sounds normal. No masses,  no organomegaly   Extremities: extremities normal, atraumatic, no cyanosis or edema Skin: no rashes   Neuro: alert, oriented, normal speech, no focal findings or movement disorder noted     EK2019. Sinus tachycardia. Diffuse nondiagnostic ST and T wave abnormalities    Assessment/Plan:       ICD-10-CM ICD-9-CM    1. Acute combined systolic (congestive) and diastolic (congestive) heart failure ([address] symptoms stabilized. Adherent to low-sodium and fluids. He could not tolerate Entresto. Increase carvedilol to 6.25 mg twice daily. Echocardiogram repeated on 3/11/2020. EF 15 to 20%. Grade 3 diastolic dysfunction. Return in 6 weeks.   Will discuss cardiac catheterization and AICD placement at that time. I50.41 428.41      428.0    2. Essential hypertension, BP controlled in the office today I10 401.9    3. Hyperlipidemia, unspecified hyperlipidemia type E78.5 272.4    4.  Morbid obesity ([facility] E66.01 278.01

## 2020-04-14 RX ORDER — FUROSEMIDE 20 MG/1
TABLET ORAL
Qty: 30 TAB | Refills: 0 | OUTPATIENT
Start: 2020-04-14

## 2020-04-14 NOTE — TELEPHONE ENCOUNTER
PCP: Gill Hernandez MD    Last appt: 3/27/2020  Future Appointments   Date Time Provider [place]   5/8/2020  9:30 AM Hortensia Arora MD [address] [place] [address]       Requested Prescriptions     Pending Prescriptions Disp Refills    carvediloL (COREG) 3.125 mg tablet 60 Tab 6     Sig: Take 1 Tab by mouth two (2) times daily (with meals).  lisinopriL (PRINIVIL, ZESTRIL) 5 mg tablet 30 Tab 6     Sig: Take 1 Tab by mouth nightly.

## 2020-04-15 RX ORDER — LISINOPRIL 5 MG/1
5 TABLET ORAL
Qty: 30 TAB | Refills: 6 | Status: SHIPPED | OUTPATIENT
Start: 2020-04-15 | End: 2020-07-14 | Stop reason: DRUGHIGH

## 2020-04-15 RX ORDER — CARVEDILOL 3.12 MG/1
3.12 TABLET ORAL 2 TIMES DAILY WITH MEALS
Qty: 60 TAB | Refills: 6 | Status: SHIPPED | OUTPATIENT
Start: 2020-04-15 | End: 2020-05-08 | Stop reason: ALTCHOICE

## 2020-05-08 ENCOUNTER — OFFICE VISIT (OUTPATIENT)
Dept: CARDIOLOGY CLINIC | Age: 40
End: 2020-05-08

## 2020-05-08 VITALS
DIASTOLIC BLOOD PRESSURE: 73 MMHG | OXYGEN SATURATION: 97 % | HEIGHT: 69 IN | SYSTOLIC BLOOD PRESSURE: 133 MMHG | WEIGHT: 315 LBS | TEMPERATURE: 99.9 F | HEART RATE: 96 BPM | BODY MASS INDEX: 46.65 KG/M2

## 2020-05-08 DIAGNOSIS — I50.41 ACUTE COMBINED SYSTOLIC (CONGESTIVE) AND DIASTOLIC (CONGESTIVE) HEART FAILURE (HCC): Primary | ICD-10-CM

## 2020-05-08 DIAGNOSIS — I11.9 HYPERTENSIVE HEART DISEASE WITHOUT HEART FAILURE: ICD-10-CM

## 2020-05-08 RX ORDER — CARVEDILOL 6.25 MG/1
TABLET ORAL 2 TIMES DAILY WITH MEALS
COMMUNITY
End: 2020-08-17 | Stop reason: SDUPTHER

## 2020-05-08 NOTE — PROGRESS NOTES
1. Have you been to the ER, urgent care clinic since your last visit? Hospitalized since your last visit? No     2. Have you seen or consulted any other health care providers outside of the [address] [place] [address] since your last visit? Include any pap smears or colon screening.   No

## 2020-05-08 NOTE — TELEPHONE ENCOUNTER
PCP: Georgina Cunha MD    Last appt: 3/27/2020  No future appointments. Requested Prescriptions     Pending Prescriptions Disp Refills    carvediloL (COREG) 6.25 mg tablet 180 Tab 3     Sig: Take 1 Tab by mouth two (2) times daily (with meals).            Other Comments:  Does increase to coreg 6.25

## 2020-05-09 RX ORDER — SPIRONOLACTONE 25 MG/1
25 TABLET ORAL DAILY
Qty: 30 TAB | Refills: 6 | Status: SHIPPED | OUTPATIENT
Start: 2020-05-09 | End: 2020-06-09 | Stop reason: SDUPTHER

## 2020-05-09 RX ORDER — CARVEDILOL 6.25 MG/1
6.25 TABLET ORAL 2 TIMES DAILY WITH MEALS
Qty: 180 TAB | Refills: 3 | Status: SHIPPED | OUTPATIENT
Start: 2020-05-09 | End: 2020-07-14 | Stop reason: SDUPTHER

## 2020-05-16 NOTE — PROGRESS NOTES
Subjective:      Annabella Palomo is in the office today for cardiac re-evaluation. He is a 80-year-old man that was  hospitalized at [facility] in July 2019 for congestive heart failure. He presented to the emergency department with complaints of shortness of breath that had worsened in the prior 48 hours. Echocardiogram demonstrated severe systolic dysfunction with ejection fraction of 16 to 20%. There was grade 2 diastolic dysfunction. He was started on diuretics and Entresto and discharged for follow-up. In the office on 8/27/2019, he said that he  had no specific problems. His breathing had been good. He  had no peripheral swelling. He also said he \"feels a little more normal \". He was started on carvedilol 3.125 twice daily. He was already taking Entresto 24/26 twice daily and furosemide 20 mg daily. He was to return in 6 weeks. For unclear reasons, he was not seen for another 6 months. He stopped Entresto because he felt terrible while taking it. He reported being seen in the emergency department almost monthly in the prior several months. In the office today he reports that he is doing well. AICD was discussed with him. He is not interested in that at this point but may consider it in the future. He reports very rare episodes of shortness of breath. Patient Active Problem List    Diagnosis Date Noted    Hyperlipidemia 07/10/2019    Acute combined systolic (congestive) and diastolic (congestive) heart failure ([facility] 07/09/2019    HTN (hypertension) 07/09/2019    Morbid obesity ([facility] 07/09/2019    Acute CHF (congestive heart failure) ([facility] 07/09/2019     Current Outpatient Medications   Medication Sig Dispense Refill    carvediloL (Coreg) 6.25 mg tablet Take  by mouth two (2) times daily (with meals).  lisinopriL (PRINIVIL, ZESTRIL) 5 mg tablet Take 1 Tab by mouth nightly.  30 Tab 6    furosemide (LASIX) 20 mg tablet TAKE 1 TABLET BY MOUTH EVERY DAY AS NEEDED FOR PERIPHERAL SWELLING 30 Tab 0    carvediloL (COREG) 6.25 mg tablet Take 1 Tab by mouth two (2) times daily (with meals). 180 Tab 3    spironolactone (ALDACTONE) 25 mg tablet Take 1 Tab by mouth daily. 30 Tab 6     No Known Allergies  Past Medical History:   Diagnosis Date    Congestive heart failure (HCC)      No past surgical history on file. Family History   Problem Relation Age of Onset   Ambrosio.Hurter Glaucoma Mother     Other Mother     Hypertension Father     No Known Problems Sister     No Known Problems Brother     No Known Problems Sister     Heart Disease Paternal Grandmother          of MI     Social History     Tobacco Use   Smoking Status Never Smoker   Smokeless Tobacco Never Used          Review of Systems, additional:  Constitutional: negative  Eyes: negative  Respiratory: negative  Cardiovascular: negative  Gastrointestinal: negative  Musculoskeletal:negative  Neurological: negative  Behvioral/Psych: negative  Endocrine: negative  ENT: negative    Objective:     Visit Vitals  /73   Pulse 96   Temp 99.9 °F (37.7 °C) (Oral)   Ht 5' 9\" (1.753 m)   Wt 152.9 kg (337 lb)   SpO2 97%   BMI 49.77 kg/m²     General:  alert, cooperative, no distress   Chest Wall: inspection normal - no chest wall deformities or tenderness, respiratory effort normal   Lung: clear to auscultation bilaterally   Heart:  normal rate and regular rhythm, S1 and S2 normal, no murmurs noted, no gallops noted, no JVD   Abdomen: soft, non-tender. Bowel sounds normal. No masses,  no organomegaly   Extremities: extremities normal, atraumatic, no cyanosis or edema Skin: no rashes   Neuro: alert, oriented, normal speech, no focal findings or movement disorder noted     EK2019. Sinus tachycardia. Diffuse nondiagnostic ST and T wave abnormalities    Assessment/Plan:       ICD-10-CM ICD-9-CM    1. Acute combined systolic (congestive) and diastolic (congestive) heart failure ([address] symptoms stabilized. Adherent to low-sodium and fluids.       He could not tolerate Entresto. Increase carvedilol to 6.25 mg twice daily. Echocardiogram repeated on 3/11/2020. EF 15 to 20%. Grade 3 diastolic dysfunction. Will start Aldactone 25 mg daily. Will discuss cardiac catheterization and AICD placement at that time. Return in 6 weeks with a BMP done prior to the visit I50.41 428.41      428.0    2. Essential hypertension, BP controlled in the office today I10 401.9    3. Hyperlipidemia, unspecified hyperlipidemia type E78.5 272.4    4.  Morbid obesity ([facility] E66.01 278.01

## 2020-06-02 RX ORDER — LISINOPRIL 5 MG/1
5 TABLET ORAL
Qty: 30 TAB | Refills: 6 | Status: CANCELLED | OUTPATIENT
Start: 2020-06-02

## 2020-06-09 RX ORDER — SPIRONOLACTONE 25 MG/1
25 TABLET ORAL DAILY
Qty: 30 TAB | Refills: 6 | Status: SHIPPED | OUTPATIENT
Start: 2020-06-09 | End: 2020-07-10 | Stop reason: SDUPTHER

## 2020-07-13 ENCOUNTER — HOSPITAL ENCOUNTER (OUTPATIENT)
Dept: LAB | Age: 40
Discharge: HOME | End: 2020-07-13
Payer: SELF-PAY

## 2020-07-13 DIAGNOSIS — I11.9 HYPERTENSIVE HEART DISEASE WITHOUT HEART FAILURE: ICD-10-CM

## 2020-07-13 DIAGNOSIS — I50.41 ACUTE COMBINED SYSTOLIC (CONGESTIVE) AND DIASTOLIC (CONGESTIVE) HEART FAILURE (HCC): ICD-10-CM

## 2020-07-13 LAB
ANION GAP SERPL CALC-SCNC: 6 MMOL/L (ref 3–18)
BUN SERPL-MCNC: 10 MG/DL (ref 7–18)
BUN/CREAT SERPL: 11 (ref 12–20)
CALCIUM SERPL-MCNC: 8.5 MG/DL (ref 8.5–10.1)
CHLORIDE SERPL-SCNC: 110 MMOL/L (ref 100–111)
CO2 SERPL-SCNC: 24 MMOL/L (ref 21–32)
CREAT SERPL-MCNC: 0.87 MG/DL (ref 0.6–1.3)
GLUCOSE SERPL-MCNC: 85 MG/DL (ref 74–99)
POTASSIUM SERPL-SCNC: 4.2 MMOL/L (ref 3.5–5.5)
SODIUM SERPL-SCNC: 140 MMOL/L (ref 136–145)

## 2020-07-13 PROCEDURE — 80048 BASIC METABOLIC PNL TOTAL CA: CPT

## 2020-07-13 PROCEDURE — 36415 COLL VENOUS BLD VENIPUNCTURE: CPT

## 2020-07-13 RX ORDER — SPIRONOLACTONE 25 MG/1
25 TABLET ORAL DAILY
Qty: 30 TAB | Refills: 6 | Status: SHIPPED | OUTPATIENT
Start: 2020-07-13

## 2020-07-13 NOTE — TELEPHONE ENCOUNTER
PCP: Tino Burrell MD    Last appt: 5/8/2020  Future Appointments   Date Time Provider [place]   7/14/2020  9:15 AM Carlos Ronquillo  [facility]       Requested Prescriptions     Pending Prescriptions Disp Refills    spironolactone (ALDACTONE) 25 mg tablet 30 Tab 6     Sig: Take 1 Tab by mouth daily.

## 2020-07-14 ENCOUNTER — OFFICE VISIT (OUTPATIENT)
Dept: CARDIOLOGY CLINIC | Age: 40
End: 2020-07-14

## 2020-07-14 VITALS
WEIGHT: 315 LBS | BODY MASS INDEX: 46.65 KG/M2 | TEMPERATURE: 99.1 F | HEIGHT: 69 IN | OXYGEN SATURATION: 97 % | HEART RATE: 94 BPM | DIASTOLIC BLOOD PRESSURE: 84 MMHG | SYSTOLIC BLOOD PRESSURE: 134 MMHG

## 2020-07-14 DIAGNOSIS — I50.22 CHRONIC SYSTOLIC CONGESTIVE HEART FAILURE (HCC): Primary | ICD-10-CM

## 2020-07-14 RX ORDER — LISINOPRIL 10 MG/1
TABLET ORAL DAILY
COMMUNITY
End: 2020-07-14 | Stop reason: SDUPTHER

## 2020-07-14 RX ORDER — LISINOPRIL 10 MG/1
10 TABLET ORAL DAILY
Qty: 90 TAB | Refills: 3 | Status: SHIPPED | OUTPATIENT
Start: 2020-07-14

## 2020-07-14 NOTE — TELEPHONE ENCOUNTER
PCP: Santos Momin MD    Last appt: 7/14/2020  No future appointments. Requested Prescriptions     Pending Prescriptions Disp Refills    lisinopriL (PRINIVIL, ZESTRIL) 10 mg tablet 90 Tab 3     Sig: Take 1 Tab by mouth daily.

## 2020-07-14 NOTE — PROGRESS NOTES
Abbie Yang presents today for   Chief Complaint   Patient presents with    Follow-up       Abbie Yang preferred language for health care discussion is english/other. Is someone accompanying this pt? No    Is the patient using any DME equipment during OV? No    Depression Screening:  3 most recent PHQ Screens 7/14/2020   Little interest or pleasure in doing things Not at all   Feeling down, depressed, irritable, or hopeless Not at all   Total Score PHQ 2 0       Learning Assessment:  Learning Assessment 7/30/2019   PRIMARY LEARNER Patient   HIGHEST LEVEL OF EDUCATION - PRIMARY LEARNER  GRADUATED HIGH SCHOOL OR GED   BARRIERS PRIMARY LEARNER NONE   PRIMARY LANGUAGE ENGLISH   LEARNER PREFERENCE PRIMARY READING     VIDEOS   ANSWERED BY pt   RELATIONSHIP SELF       Abuse Screening:  Completed    Fall Risk  Completed    Pt currently taking Anticoagulant therapy? no    Coordination of Care:  1. Have you been to the ER, urgent care clinic since your last visit? Hospitalized since your last visit? No    2. Have you seen or consulted any other health care providers outside of the [address] [place] [address] since your last visit? Include any pap smears or colon screening.  no

## 2020-07-18 NOTE — PROGRESS NOTES
Subjective:      Maury Ball is in the office today for cardiac re-evaluation. He is a 67-year-old man that was  hospitalized at [facility] in July 2019 for congestive heart failure. He presented to the emergency department with complaints of shortness of breath that had worsened in the prior 48 hours. Echocardiogram demonstrated severe systolic dysfunction with ejection fraction of 16 to 20%. There was grade 2 diastolic dysfunction. He was started on diuretics and Entresto and discharged for follow-up. In the office on 8/27/2019, he said that he  had no specific problems. His breathing had been good. He  had no peripheral swelling. He also said he \"feels a little more normal \". He was started on carvedilol 3.125 twice daily. He was already taking Entresto 24/26 twice daily and furosemide 20 mg daily. He was to return in 6 weeks. For unclear reasons, he was not seen for another 6 months. He stopped Entresto because he felt terrible while taking it. He reported being seen in the emergency department almost monthly in the prior several months. In the office today he reports that he reports that he feels \"great \". He has no specific complaints. Patient Active Problem List    Diagnosis Date Noted    Hyperlipidemia 07/10/2019    Acute combined systolic (congestive) and diastolic (congestive) heart failure ([facility] [address] 07/09/2019    HTN (hypertension) 07/09/2019    Morbid obesity ([facility] [address] 07/09/2019    Acute CHF (congestive heart failure) ([facility] 07/09/2019     Current Outpatient Medications   Medication Sig Dispense Refill    spironolactone (ALDACTONE) 25 mg tablet Take 1 Tab by mouth daily. 30 Tab 6    carvediloL (Coreg) 6.25 mg tablet Take  by mouth two (2) times daily (with meals).  furosemide (LASIX) 20 mg tablet TAKE 1 TABLET BY MOUTH EVERY DAY AS NEEDED FOR PERIPHERAL SWELLING 30 Tab 0    lisinopriL (PRINIVIL, ZESTRIL) 10 mg tablet Take 1 Tab by mouth daily.  90 Tab 3     No Known Allergies  Past Medical History:   Diagnosis Date    Congestive heart failure ([address]      No past surgical history on file. Family History   Problem Relation Age of Onset   Nandini Mccullough Glaucoma Mother     Other Mother     Hypertension Father     No Known Problems Sister     No Known Problems Brother     No Known Problems Sister     Heart Disease Paternal Grandmother          of MI     Social History     Tobacco Use   Smoking Status Never Smoker   Smokeless Tobacco Never Used          Review of Systems, additional:  Constitutional: negative  Eyes: negative  Respiratory: negative  Cardiovascular: negative  Gastrointestinal: negative  Musculoskeletal:negative  Neurological: negative  Behvioral/Psych: negative  Endocrine: negative  ENT: negative    Objective:     Visit Vitals  /84   Pulse 94   Temp 99.1 °F (37.3 °C) (Oral)   Ht 5' 9\" (1.753 m)   Wt 335 lb (152 kg)   SpO2 97%   BMI 49.47 kg/m²     General:  alert, cooperative, no distress   Chest Wall: inspection normal - no chest wall deformities or tenderness, respiratory effort normal   Lung: clear to auscultation bilaterally   Heart:  normal rate and regular rhythm, S1 and S2 normal, no murmurs noted, no gallops noted, no JVD   Abdomen: soft, non-tender. Bowel sounds normal. No masses,  no organomegaly   Extremities: extremities normal, atraumatic, no cyanosis or edema Skin: no rashes   Neuro: alert, oriented, normal speech, no focal findings or movement disorder noted     EK2019. Sinus tachycardia. Diffuse nondiagnostic ST and T wave abnormalities    Assessment/Plan:       ICD-10-CM ICD-9-CM    1. Acute combined systolic (congestive) and diastolic (congestive) heart failure ([address] symptoms stable. He is adherent to a low-sodium diet. He could not tolerate Entresto. At the last visit the carvedilol was increased to 6.25 mg twice daily. Echocardiogram repeated on 3/11/2020. EF 15 to 20%. Grade 3 diastolic dysfunction.   He was also started on  Aldactone 25 mg daily.    Will discuss cardiac catheterization and AICD placement at that time next visit. He did have a BMP with no magnesium as was ordered on 7/13/2020. Potassium was 4.2.  BUN and creatinine were 10 and 0.87. Increase lisinopril to 10 mg daily and return in 10 weeks I50.41 428.41      428.0    2. Essential hypertension, BP controlled in the office today I10 401.9    3. Hyperlipidemia, unspecified hyperlipidemia type E78.5 272.4    4.  Morbid obesity ([facility] [address] E66.01 278.01

## 2020-08-17 RX ORDER — SPIRONOLACTONE 25 MG/1
25 TABLET ORAL DAILY
Qty: 30 TAB | Refills: 6 | OUTPATIENT
Start: 2020-08-17

## 2020-08-17 NOTE — TELEPHONE ENCOUNTER
PCP: Tino Burrell MD    Last appt: 7/14/2020  Future Appointments   Date Time Provider [place]   9/11/2020  9:30 AM [facility]       Requested Prescriptions     Pending Prescriptions Disp Refills    carvediloL (Coreg) 6.25 mg tablet 180 Tab 3     Sig: Take 1 Tab by mouth two (2) times daily (with meals). Refused Prescriptions Disp Refills    spironolactone (ALDACTONE) 25 mg tablet 30 Tab 6     Sig: Take 1 Tab by mouth daily.      Refused By: Jose Luis Alvarez     Reason for Refusal: other

## 2020-08-19 RX ORDER — CARVEDILOL 6.25 MG/1
6.25 TABLET ORAL 2 TIMES DAILY WITH MEALS
Qty: 180 TAB | Refills: 3 | Status: SHIPPED | OUTPATIENT
Start: 2020-08-19

## 2020-09-30 ENCOUNTER — OFFICE VISIT (OUTPATIENT)
Dept: CARDIOLOGY CLINIC | Age: 40
End: 2020-09-30

## 2020-09-30 VITALS
WEIGHT: 315 LBS | HEART RATE: 103 BPM | HEIGHT: 69 IN | OXYGEN SATURATION: 97 % | SYSTOLIC BLOOD PRESSURE: 143 MMHG | TEMPERATURE: 97.7 F | DIASTOLIC BLOOD PRESSURE: 87 MMHG | BODY MASS INDEX: 46.65 KG/M2

## 2020-09-30 DIAGNOSIS — I42.9 CARDIOMYOPATHY, UNSPECIFIED TYPE (HCC): Primary | ICD-10-CM

## 2020-09-30 DIAGNOSIS — I42.9 CARDIOMYOPATHY, UNSPECIFIED TYPE (HCC): ICD-10-CM

## 2020-09-30 PROCEDURE — 99214 OFFICE O/P EST MOD 30 MIN: CPT | Performed by: INTERNAL MEDICINE

## 2020-09-30 NOTE — PROGRESS NOTES
Magdy Pompa presents today for   Chief Complaint   Patient presents with    Follow-up       Magdy Pompa preferred language for health care discussion is english/other. Is someone accompanying this pt? no    Is the patient using any DME equipment during [address]? no    Depression Screening:  3 most recent PHQ Screens 9/30/2020   Little interest or pleasure in doing things Not at all   Feeling down, depressed, irritable, or hopeless Not at all   Total Score PHQ 2 0       Learning Assessment:  Learning Assessment 7/30/2019   PRIMARY LEARNER Patient   HIGHEST LEVEL OF EDUCATION - PRIMARY LEARNER  GRADUATED HIGH SCHOOL OR GED   BARRIERS PRIMARY LEARNER NONE   PRIMARY LANGUAGE ENGLISH   LEARNER PREFERENCE PRIMARY READING     VIDEOS   ANSWERED BY pt   RELATIONSHIP SELF       Abuse Screening:  Abuse Screening Questionnaire 7/14/2020   Do you ever feel afraid of your partner? N   Are you in a relationship with someone who physically or mentally threatens you? N   Is it safe for you to go home? Y       Fall Risk  Fall Risk Assessment, last 12 mths 7/14/2020   Able to walk? Yes   Fall in past 12 months? No       Pt currently taking Anticoagulant therapy? no    Coordination of Care:  1. Have you been to the ER, urgent care clinic since your last visit? Hospitalized since your last visit? yes    2. Have you seen or consulted any other health care providers outside of the [address] [place] [address] since your last visit? Include any pap smears or colon screening.  no

## 2020-09-30 NOTE — PATIENT INSTRUCTIONS
Testing Echo/ Nuclear Stress Please call Sherry scheduling at [phone]  to schedule an appointment. All testing is completed at [address] [facility] **call office 5-7 days after testing for results**

## 2020-10-01 NOTE — PROGRESS NOTES
Subjective:      Nishant Mena is in the office today for cardiac re-evaluation. He is a 28-year-old man that was  hospitalized at [facility] in July 2019 for congestive heart failure. He presented to the emergency department with complaints of shortness of breath that had worsened in the prior 48 hours. An echocardiogram demonstrated severe systolic dysfunction with an ejection fraction of 16 to 20%. There was grade 2 diastolic dysfunction. He was started on diuretics and Entresto and discharged for follow-up. In the office on 8/27/2019, he said that he  had no specific problems. His breathing had been good. He  had no peripheral swelling. He also said he \"feels a little more normal \". He was started on carvedilol 3.125 twice daily. He was already taking Entresto 24/26 twice daily and furosemide 20 mg daily. He was to return in 6 weeks. For unclear reasons, he was not seen for another 6 months. He stopped Entresto because he felt terrible while taking it. He reported being seen in the emergency department almost monthly in the prior several months. In the office today he reports that he feels \"pretty fine \". He has had no  chest pain. Denies limiting dyspnea. He does have a little shortness of breath \"from time to time \". His last echocardiogram was done on 3/11/2020 at which time his ejection fraction was 15 to 20%. Patient Active Problem List    Diagnosis Date Noted    Hyperlipidemia 07/10/2019    Acute combined systolic (congestive) and diastolic (congestive) heart failure ([facility] 07/09/2019    HTN (hypertension) 07/09/2019    Morbid obesity ([facility] 07/09/2019    Acute CHF (congestive heart failure) ([facility] 07/09/2019     Current Outpatient Medications   Medication Sig Dispense Refill    carvediloL (Coreg) 6.25 mg tablet Take 1 Tab by mouth two (2) times daily (with meals). 180 Tab 3    lisinopriL (PRINIVIL, ZESTRIL) 10 mg tablet Take 1 Tab by mouth daily.  90 Tab 3    spironolactone (ALDACTONE) 25 mg tablet Take 1 Tab by mouth daily. 30 Tab 6    furosemide (LASIX) 20 mg tablet TAKE 1 TABLET BY MOUTH EVERY DAY AS NEEDED FOR PERIPHERAL SWELLING 30 Tab 0     No Known Allergies  Past Medical History:   Diagnosis Date    Congestive heart failure (HCC)      No past surgical history on file. Family History   Problem Relation Age of Onset   Neptali Hu Glaucoma Mother     Other Mother     Hypertension Father     No Known Problems Sister     No Known Problems Brother     No Known Problems Sister     Heart Disease Paternal Grandmother          of MI     Social History     Tobacco Use   Smoking Status Never Smoker   Smokeless Tobacco Never Used          Review of Systems, additional:  Constitutional: negative  Eyes: negative  Respiratory: negative  Cardiovascular: negative  Gastrointestinal: negative  Musculoskeletal:negative  Neurological: negative  Behvioral/Psych: negative  Endocrine: negative  ENT: negative    Objective:     Visit Vitals  BP (!) 143/87   Pulse (!) 103   Temp 97.7 °F (36.5 °C) (Temporal)   Ht 5' 9\" (1.753 m)   Wt 329 lb (149.2 kg)   SpO2 97%   BMI 48.58 kg/m²     General:  alert, cooperative, no distress   Chest Wall: inspection normal - no chest wall deformities or tenderness, respiratory effort normal   Lung: clear to auscultation bilaterally   Heart:  normal rate and regular rhythm, S1 and S2 normal, no murmurs noted, no gallops noted, no JVD   Abdomen: soft, non-tender. Bowel sounds normal. No masses,  no organomegaly   Extremities: extremities normal, atraumatic, no cyanosis or edema Skin: no rashes   Neuro: alert, oriented, normal speech, no focal findings or movement disorder noted     EK2019. Sinus tachycardia. Diffuse nondiagnostic ST and T wave abnormalities    Assessment/Plan:       ICD-10-CM ICD-9-CM    1. Acute combined systolic (congestive) and diastolic (congestive) heart failure ([address] symptoms stable. He is adherent to a low-sodium diet.      He could not tolerate Entresto. At the last visit the carvedilol was increased to 6.25 mg twice daily. Echocardiogram repeated on 3/11/2020. EF 15 to 20%. Grade 3 diastolic dysfunction. He was also started on  Aldactone 25 mg daily. Will order nuclear stress test to exclude ischemia. Increase lisinopril to 20 mg daily. Will order echocardiogram to be done in 1 month. Appointment to see Dr. Halina Wray following the echocardiogram I50.41 428.41      428.0    2. Essential hypertension, BP controlled in the office today I10 401.9    3. Hyperlipidemia, unspecified hyperlipidemia type E78.5 272.4    4.  Morbid obesity ([address] E66.01 278.01

## 2020-10-06 ENCOUNTER — APPOINTMENT (OUTPATIENT)
Dept: GENERAL RADIOLOGY | Age: 40
End: 2020-10-06
Attending: PHYSICIAN ASSISTANT
Payer: MEDICAID

## 2020-10-06 ENCOUNTER — HOSPITAL ENCOUNTER (EMERGENCY)
Age: 40
Discharge: HOME | End: 2020-10-06
Attending: EMERGENCY MEDICINE
Payer: MEDICAID

## 2020-10-06 VITALS
HEART RATE: 105 BPM | RESPIRATION RATE: 22 BRPM | HEIGHT: 69 IN | OXYGEN SATURATION: 96 % | WEIGHT: 315 LBS | BODY MASS INDEX: 46.65 KG/M2 | TEMPERATURE: 97.1 F | DIASTOLIC BLOOD PRESSURE: 93 MMHG | SYSTOLIC BLOOD PRESSURE: 123 MMHG

## 2020-10-06 DIAGNOSIS — R79.89 ELEVATED BRAIN NATRIURETIC PEPTIDE (BNP) LEVEL: ICD-10-CM

## 2020-10-06 DIAGNOSIS — R07.9 CHEST PAIN, UNSPECIFIED TYPE: Primary | ICD-10-CM

## 2020-10-06 DIAGNOSIS — R06.02 SOB (SHORTNESS OF BREATH): ICD-10-CM

## 2020-10-06 LAB
ALBUMIN SERPL-MCNC: 3.2 G/DL (ref 3.4–5)
ALBUMIN/GLOB SERPL: 0.7 {RATIO} (ref 0.8–1.7)
ALP SERPL-CCNC: 93 U/L (ref 45–117)
ALT SERPL-CCNC: 26 U/L (ref 16–61)
ANION GAP SERPL CALC-SCNC: 7 MMOL/L (ref 3–18)
AST SERPL-CCNC: 20 U/L (ref 10–38)
BASOPHILS # BLD: 0 K/UL (ref 0–0.1)
BASOPHILS NFR BLD: 0 % (ref 0–2)
BILIRUB SERPL-MCNC: 0.9 MG/DL (ref 0.2–1)
BNP SERPL-MCNC: 686 PG/ML (ref 0–450)
BUN SERPL-MCNC: 6 MG/DL (ref 7–18)
BUN/CREAT SERPL: 8 (ref 12–20)
CALCIUM SERPL-MCNC: 8.5 MG/DL (ref 8.5–10.1)
CHLORIDE SERPL-SCNC: 108 MMOL/L (ref 100–111)
CK MB CFR SERPL CALC: 1.1 % (ref 0–4)
CK MB SERPL-MCNC: 1.2 NG/ML (ref 5–25)
CK SERPL-CCNC: 106 U/L (ref 39–308)
CO2 SERPL-SCNC: 25 MMOL/L (ref 21–32)
CREAT SERPL-MCNC: 0.75 MG/DL (ref 0.6–1.3)
DIFFERENTIAL METHOD BLD: NORMAL
EOSINOPHIL # BLD: 0.2 K/UL (ref 0–0.4)
EOSINOPHIL NFR BLD: 1 % (ref 0–5)
ERYTHROCYTE [DISTWIDTH] IN BLOOD BY AUTOMATED COUNT: 12.5 % (ref 11.6–14.5)
GLOBULIN SER CALC-MCNC: 4.3 G/DL (ref 2–4)
GLUCOSE SERPL-MCNC: 81 MG/DL (ref 74–99)
HCT VFR BLD AUTO: 45.4 % (ref 36–48)
HGB BLD-MCNC: 15.7 G/DL (ref 13–16)
LYMPHOCYTES # BLD: 2.7 K/UL (ref 0.9–3.6)
LYMPHOCYTES NFR BLD: 23 % (ref 21–52)
MAGNESIUM SERPL-MCNC: 2 MG/DL (ref 1.6–2.6)
MCH RBC QN AUTO: 31.4 PG (ref 24–34)
MCHC RBC AUTO-ENTMCNC: 34.6 G/DL (ref 31–37)
MCV RBC AUTO: 90.8 FL (ref 74–97)
MONOCYTES # BLD: 1 K/UL (ref 0.05–1.2)
MONOCYTES NFR BLD: 8 % (ref 3–10)
NEUTS SEG # BLD: 7.8 K/UL (ref 1.8–8)
NEUTS SEG NFR BLD: 68 % (ref 40–73)
PLATELET # BLD AUTO: 277 K/UL (ref 135–420)
PMV BLD AUTO: 10.6 FL (ref 9.2–11.8)
POTASSIUM SERPL-SCNC: 3.4 MMOL/L (ref 3.5–5.5)
PROT SERPL-MCNC: 7.5 G/DL (ref 6.4–8.2)
RBC # BLD AUTO: 5 M/UL (ref 4.7–5.5)
SODIUM SERPL-SCNC: 140 MMOL/L (ref 136–145)
TROPONIN I SERPL-MCNC: <0.02 NG/ML (ref 0–0.04)
WBC # BLD AUTO: 11.7 K/UL (ref 4.6–13.2)

## 2020-10-06 PROCEDURE — 80053 COMPREHEN METABOLIC PANEL: CPT

## 2020-10-06 PROCEDURE — 83735 ASSAY OF MAGNESIUM: CPT

## 2020-10-06 PROCEDURE — 85025 COMPLETE CBC W/AUTO DIFF WBC: CPT

## 2020-10-06 PROCEDURE — 71045 X-RAY EXAM CHEST 1 VIEW: CPT

## 2020-10-06 PROCEDURE — 99284 EMERGENCY DEPT VISIT MOD MDM: CPT

## 2020-10-06 PROCEDURE — 74011250637 HC RX REV CODE- 250/637: Performed by: PHYSICIAN ASSISTANT

## 2020-10-06 PROCEDURE — 93005 ELECTROCARDIOGRAM TRACING: CPT

## 2020-10-06 PROCEDURE — 82550 ASSAY OF CK (CPK): CPT

## 2020-10-06 PROCEDURE — 83880 ASSAY OF NATRIURETIC PEPTIDE: CPT

## 2020-10-06 RX ADMIN — POTASSIUM BICARBONATE 20 MEQ: 782 TABLET, EFFERVESCENT ORAL at 15:47

## 2020-10-06 NOTE — DISCHARGE INSTRUCTIONS
Patient Education        Chest Pain: Care Instructions  Your Care Instructions     There are many things that can cause chest pain. Some are not serious and will get better on their own in a few days. But some kinds of chest pain need more testing and treatment. Your doctor may have recommended a follow-up visit in the next 8 to 12 hours. If you are not getting better, you may need more tests or treatment. Even though your doctor has released you, you still need to watch for any problems. The doctor carefully checked you, but sometimes problems can develop later. If you have new symptoms or if your symptoms do not get better, get medical care right away. If you have worse or different chest pain or pressure that lasts more than 5 minutes or you passed out (lost consciousness), call 911 or seek other emergency help right away. A medical visit is only one step in your treatment. Even if you feel better, you still need to do what your doctor recommends, such as going to all suggested follow-up appointments and taking medicines exactly as directed. This will help you recover and help prevent future problems. How can you care for yourself at home? · Rest until you feel better. · Take your medicine exactly as prescribed. Call your doctor if you think you are having a problem with your medicine. · Do not drive after taking a prescription pain medicine. When should you call for help? Call 911 if:     · You passed out (lost consciousness).     · You have severe difficulty breathing.     · You have symptoms of a heart attack. These may include:  ? Chest pain or pressure, or a strange feeling in your chest.  ? Sweating. ? Shortness of breath. ? Nausea or vomiting. ? Pain, pressure, or a strange feeling in your back, neck, jaw, or upper belly or in one or both shoulders or arms. ? Lightheadedness or sudden weakness. ? A fast or irregular heartbeat.   After you call 911, the  may tell you to chew 1 adult-strength or 2 to 4 low-dose aspirin. Wait for an ambulance. Do not try to drive yourself. Call your doctor today if:     · You have any trouble breathing.     · Your chest pain gets worse.     · You are dizzy or lightheaded, or you feel like you may faint.     · You are not getting better as expected.     · You are having new or different chest pain. Where can you learn more? Go to http://www.[employer].com/  Enter A120 in the search box to learn more about \"Chest Pain: Care Instructions. \"  Current as of: June 26, 2019               Content Version: 12.6  © 4598-4774 [employer]. Care instructions adapted under license by [employer] (which disclaims liability or warranty for this information). If you have questions about a medical condition or this instruction, always ask your healthcare professional. [place] [address] any warranty or liability for your use of this information. Patient Education        Shortness of Breath: Care Instructions  Your Care Instructions     Shortness of breath has many causes. Sometimes conditions such as anxiety can lead to shortness of breath. Some people get mild shortness of breath when they exercise. Trouble breathing also can be a symptom of a serious problem, such as asthma, lung disease, emphysema, heart problems, and pneumonia. If your shortness of breath continues, you may need tests and treatment. Watch for any changes in your breathing and other symptoms. Follow-up care is a key part of your treatment and safety. Be sure to make and go to all appointments, and call your doctor if you are having problems. It's also a good idea to know your test results and keep a list of the medicines you take. How can you care for yourself at home? · Do not smoke or allow others to smoke around you. If you need help quitting, talk to your doctor about stop-smoking programs and medicines.  These can increase your chances of quitting for good. · Get plenty of rest and sleep. · Take your medicines exactly as prescribed. Call your doctor if you think you are having a problem with your medicine. · Find healthy ways to deal with stress. ? Exercise daily. ? Get plenty of sleep. ? Eat regularly and well. When should you call for help? Call 911 anytime you think you may need emergency care. For example, call if:    · You have severe shortness of breath.     · You have symptoms of a heart attack. These may include:  ? Chest pain or pressure, or a strange feeling in the chest.  ? Sweating. ? Shortness of breath. ? Nausea or vomiting. ? Pain, pressure, or a strange feeling in the back, neck, jaw, or upper belly or in one or both shoulders or arms. ? Lightheadedness or sudden weakness. ? A fast or irregular heartbeat. After you call 911, the  may tell you to chew 1 adult-strength or 2 to 4 low-dose aspirin. Wait for an ambulance. Do not try to drive yourself. Call your doctor now or seek immediate medical care if:    · Your shortness of breath gets worse or you start to wheeze. Wheezing is a high-pitched sound when you breathe.     · You wake up at night out of breath or have to prop your head up on several pillows to breathe.     · You are short of breath after only light activity or while at rest.   Watch closely for changes in your health, and be sure to contact your doctor if:    · You do not get better over the next 1 to 2 days. Where can you learn more? Go to http://www.gray.com/  Enter S780 in the search box to learn more about \"Shortness of Breath: Care Instructions. \"  Current as of: February 24, 2020               Content Version: 12.6  © 1795-8302 [employer]. Care instructions adapted under license by [employer] (which disclaims liability or warranty for this information).  If you have questions about a medical condition or this instruction, always ask your healthcare professional. [place] [address] any warranty or liability for your use of this information.

## 2020-10-06 NOTE — ED TRIAGE NOTES
1 week of CHF flare. Feels SOB. No swelling . Took diurectic with no relief. Cough .  Tested neg 3 weeks ago for COVID

## 2020-10-06 NOTE — ED PROVIDER NOTES
EMERGENCY DEPARTMENT HISTORY AND PHYSICAL EXAM    2:35 PM      Date: 10/6/2020  Patient Name: Cristina Whatley    History of Presenting Illness     Chief Complaint   Patient presents with    Shortness of Breath    Chest Pain    Cough         History Provided By: Patient    Additional History (Context): Cristina Whatley is a 45 y.o. male with CHF who presents with complaints of chest pain and difficulty breathing that has been present for the last 7 days. Patient states that the pain in his chest is substernal in location, he states that it feels dull, does not radiate. He states that sitting up makes it feel better, however lying back makes it feel worse. Patient denies any cough or fevers. He denies any ill contacts or known coronavirus exposures. He states he is concerned that he may be having a acute CHF flareup. He denies any fevers, chills, abdominal pain, NVD, dysuria, hematuria. PCP: Bob Blair MD    Current Outpatient Medications   Medication Sig Dispense Refill    carvediloL (Coreg) 6.25 mg tablet Take 1 Tab by mouth two (2) times daily (with meals). 180 Tab 3    lisinopriL (PRINIVIL, ZESTRIL) 10 mg tablet Take 1 Tab by mouth daily. 90 Tab 3    spironolactone (ALDACTONE) 25 mg tablet Take 1 Tab by mouth daily. 30 Tab 6    furosemide (LASIX) 20 mg tablet TAKE 1 TABLET BY MOUTH EVERY DAY AS NEEDED FOR PERIPHERAL SWELLING 30 Tab 0       Past History     Past Medical History:  Past Medical History:   Diagnosis Date    Congestive heart failure ([address]        Past Surgical History:  History reviewed. No pertinent surgical history.     Family History:  Family History   Problem Relation Age of Onset   [de-identified] Glaucoma Mother     Other Mother     Hypertension Father     No Known Problems Sister     No Known Problems Brother     No Known Problems Sister     Heart Disease Paternal Grandmother          of MI       Social History:  Social History     Tobacco Use    Smoking status: Never Smoker    Smokeless tobacco: Never Used   Substance Use Topics    Alcohol use: Never     Frequency: Never    Drug use: Never       Allergies:  No Known Allergies      Review of Systems       Review of Systems   Constitutional: Negative. HENT: Negative. Respiratory: Positive for shortness of breath. Negative for cough, chest tightness and wheezing. Cardiovascular: Positive for chest pain. Negative for palpitations and leg swelling. Gastrointestinal: Negative. Genitourinary: Negative. Musculoskeletal: Negative. Skin: Negative. All other systems reviewed and are negative. Physical Exam     Visit Vitals  BP (!) 142/95 (BP 1 Location: Right arm, BP Patient Position: At rest)   Pulse (!) 106   Temp 97.1 °F (36.2 °C)   Resp 16   Ht 5' 9\" (1.753 m)   Wt 149.7 kg (330 lb)   SpO2 95%   BMI 48.73 kg/m²         Physical Exam  Vitals signs and nursing note reviewed. Constitutional:       General: He is not in acute distress. Appearance: He is obese. He is not ill-appearing, toxic-appearing or diaphoretic. HENT:      Head: Normocephalic and atraumatic. Right Ear: External ear normal.      Left Ear: External ear normal.      Nose: Nose normal.      Mouth/Throat:      Mouth: Mucous membranes are moist.      Pharynx: Oropharynx is clear. Eyes:      Extraocular Movements: Extraocular movements intact. Neck:      Musculoskeletal: Neck supple. Cardiovascular:      Rate and Rhythm: Normal rate and regular rhythm. Pulses: Normal pulses. Heart sounds: Normal heart sounds. No murmur. No gallop. Pulmonary:      Effort: Pulmonary effort is normal.      Breath sounds: No wheezing, rhonchi or rales. Comments: Diminished breath sounds bilaterally. Abdominal:      General: Bowel sounds are normal. There is no distension. Palpations: Abdomen is soft. There is no mass. Tenderness: There is no abdominal tenderness. There is no guarding or rebound.    Musculoskeletal:      Right lower leg: No edema. Left lower leg: No edema. Skin:     General: Skin is warm and dry. Capillary Refill: Capillary refill takes less than 2 seconds. Neurological:      General: No focal deficit present. Mental Status: He is alert and oriented to person, place, and time. Cranial Nerves: No cranial nerve deficit. Diagnostic Study Results     Labs -  Recent Results (from the past 12 hour(s))   CBC WITH AUTOMATED DIFF    Collection Time: 10/06/20  1:44 PM   Result Value Ref Range    WBC 11.7 4.6 - 13.2 K/uL    RBC 5.00 4.70 - 5.50 M/uL    HGB 15.7 13.0 - 16.0 g/dL    HCT 45.4 36.0 - 48.0 %    MCV 90.8 74.0 - 97.0 FL    MCH 31.4 24.0 - 34.0 PG    MCHC 34.6 31.0 - 37.0 g/dL    RDW 12.5 11.6 - 14.5 %    PLATELET 221 496 - 571 K/uL    MPV 10.6 9.2 - 11.8 FL    NEUTROPHILS 68 40 - 73 %    LYMPHOCYTES 23 21 - 52 %    MONOCYTES 8 3 - 10 %    EOSINOPHILS 1 0 - 5 %    BASOPHILS 0 0 - 2 %    ABS. NEUTROPHILS 7.8 1.8 - 8.0 K/UL    ABS. LYMPHOCYTES 2.7 0.9 - 3.6 K/UL    ABS. MONOCYTES 1.0 0.05 - 1.2 K/UL    ABS. EOSINOPHILS 0.2 0.0 - 0.4 K/UL    ABS. BASOPHILS 0.0 0.0 - 0.1 K/UL    DF AUTOMATED     METABOLIC PANEL, COMPREHENSIVE    Collection Time: 10/06/20  1:44 PM   Result Value Ref Range    Sodium 140 136 - 145 mmol/L    Potassium 3.4 (L) 3.5 - 5.5 mmol/L    Chloride 108 100 - 111 mmol/L    CO2 25 21 - 32 mmol/L    Anion gap 7 3.0 - 18 mmol/L    Glucose 81 74 - 99 mg/dL    BUN 6 (L) 7.0 - 18 MG/DL    Creatinine 0.75 0.6 - 1.3 MG/DL    BUN/Creatinine ratio 8 (L) 12 - 20      GFR est AA >60 >60 ml/min/1.73m2    GFR est non-AA >60 >60 ml/min/1.73m2    Calcium 8.5 8.5 - 10.1 MG/DL    Bilirubin, total 0.9 0.2 - 1.0 MG/DL    ALT (SGPT) 26 16 - 61 U/L    AST (SGOT) 20 10 - 38 U/L    Alk.  phosphatase 93 45 - 117 U/L    Protein, total 7.5 6.4 - 8.2 g/dL    Albumin 3.2 (L) 3.4 - 5.0 g/dL    Globulin 4.3 (H) 2.0 - 4.0 g/dL    A-G Ratio 0.7 (L) 0.8 - 1.7     NT-PRO BNP    Collection Time: 10/06/20  1:44 PM Result Value Ref Range    NT pro- (H) 0 - 450 PG/ML   CARDIAC PANEL,(CK, CKMB & TROPONIN)    Collection Time: 10/06/20  1:44 PM   Result Value Ref Range    CK - MB 1.2 <3.6 ng/ml    CK-MB Index 1.1 0.0 - 4.0 %     39 - 308 U/L    Troponin-I, QT <0.02 0.0 - 0.045 NG/ML   MAGNESIUM    Collection Time: 10/06/20  1:44 PM   Result Value Ref Range    Magnesium 2.0 1.6 - 2.6 mg/dL   EKG, 12 LEAD, INITIAL    Collection Time: 10/06/20  1:53 PM   Result Value Ref Range    Ventricular Rate 107 BPM    Atrial Rate 107 BPM    P-R Interval 158 ms    QRS Duration 90 ms    Q-T Interval 374 ms    QTC Calculation (Bezet) 499 ms    Calculated P Axis 74 degrees    Calculated R Axis 19 degrees    Calculated T Axis 36 degrees    Diagnosis       Sinus tachycardia  Nonspecific T wave abnormality  Abnormal ECG  When compared with ECG of 13-OCT-2019 11:12,  Nonspecific T wave abnormality, worse in Lateral leads  QT has lengthened         Radiologic Studies -   XR CHEST PORT    (Results Pending)         Medical Decision Making   I am the first provider for this patient. I reviewed the vital signs, available nursing notes, past medical history, past surgical history, family history and social history. Vital Signs-Reviewed the patient's vital signs. Records Reviewed: Nursing Notes and Old Medical Records (Time of Review: 2:35 PM)    ED Course: Progress Notes, Reevaluation, and Consults:  1235: Met with patient, reviewed history, performed physical exam.  Physical exam as noted above. There are decreased breath sounds bilaterally, however there are no appreciable wheezes, rhonchi, or rales. Will check a CBC, CMP, cardiac panel, BNP, chest x-ray. ECG reviewed and shows sinus tachycardia with 107 bpm.    1541: CBC was largely unremarkable, CMP showed mild hypokalemia at 3.4, will give Effer-K prior to discharge. Cardiac panel was negative, BNP mildly elevated at 686.   Patient was advised to continue his diuretics at home, he was advised to follow-up with his cardiologist for reassessment. Provider Notes (Medical Decision Making):   67-year-old male seen in the emergency department for complaints of chest pain or shortness of breath is been present for approximately 1 week. Work-up in the emergency department is largely unremarkable. Cardiac panel is negative, BNP is mildly elevated at 686. Chest x-ray was reviewed and is largely unremarkable per my interpretation. There is no focal pneumonic infiltrate. Patient was advised to continue his current medication regimen and follow-up with cardiology soon as possible. He was advised to follow-up with his BCP as soon as possible for reassessment. He was advised to return to the ED if symptoms worsen, or as needed. Diagnosis     Clinical Impression:   1. Chest pain, unspecified type    2. SOB (shortness of breath)    3. Elevated brain natriuretic peptide (BNP) level        Disposition: Home    Follow-up Information     Follow up With Specialties Details Why Contact Info    Mebrahtu, Delberta Babinski, MD Internal Medicine Call in 1 day For follow up regarding ER visit. [address] [place] [address]  [phone]      [facility] EMERGENCY DEPT Emergency Medicine  Immediately if symptoms worsen, As needed. [address] [facility]  [phone]           Patient's Medications   Start Taking    No medications on file   Continue Taking    CARVEDILOL (COREG) 6.25 MG TABLET    Take 1 Tab by mouth two (2) times daily (with meals). FUROSEMIDE (LASIX) 20 MG TABLET    TAKE 1 TABLET BY MOUTH EVERY DAY AS NEEDED FOR PERIPHERAL SWELLING    LISINOPRIL (PRINIVIL, ZESTRIL) 10 MG TABLET    Take 1 Tab by mouth daily. SPIRONOLACTONE (ALDACTONE) 25 MG TABLET    Take 1 Tab by mouth daily.    These Medications have changed    No medications on file   Stop Taking    No medications on file       Bean Lopez PA-C    Dictation disclaimer:  Please note that this dictation was completed with Dragon, the computer voice recognition software. Quite often unanticipated grammatical, syntax, homophones, and other interpretive errors are inadvertently transcribed by the computer software. Please disregard these errors. Please excuse any errors that have escaped final proofreading.

## 2020-10-06 NOTE — ED NOTES
I have reviewed discharge instructions with the patient. The patient verbalized understanding.   Patient armband removed and shredded  Pt ambulatory to [facility]  No px

## 2020-10-07 LAB
ATRIAL RATE: 107 BPM
CALCULATED P AXIS, ECG09: 74 DEGREES
CALCULATED R AXIS, ECG10: 19 DEGREES
CALCULATED T AXIS, ECG11: 36 DEGREES
DIAGNOSIS, 93000: NORMAL
P-R INTERVAL, ECG05: 158 MS
Q-T INTERVAL, ECG07: 374 MS
QRS DURATION, ECG06: 90 MS
QTC CALCULATION (BEZET), ECG08: 499 MS
VENTRICULAR RATE, ECG03: 107 BPM

## 2020-11-11 ENCOUNTER — HOSPITAL ENCOUNTER (OUTPATIENT)
Dept: NON INVASIVE DIAGNOSTICS | Age: 40
Discharge: HOME | End: 2020-11-11
Attending: INTERNAL MEDICINE
Payer: MEDICAID

## 2020-11-11 ENCOUNTER — HOSPITAL ENCOUNTER (OUTPATIENT)
Dept: NUCLEAR MEDICINE | Age: 40
Discharge: HOME | End: 2020-11-11
Attending: INTERNAL MEDICINE
Payer: MEDICAID

## 2020-11-11 VITALS
HEIGHT: 69 IN | SYSTOLIC BLOOD PRESSURE: 140 MMHG | DIASTOLIC BLOOD PRESSURE: 77 MMHG | WEIGHT: 315 LBS | BODY MASS INDEX: 46.65 KG/M2

## 2020-11-11 PROCEDURE — 93017 CV STRESS TEST TRACING ONLY: CPT

## 2020-11-11 PROCEDURE — A9500 TC99M SESTAMIBI: HCPCS

## 2020-11-13 ENCOUNTER — HOSPITAL ENCOUNTER (OUTPATIENT)
Dept: NON INVASIVE DIAGNOSTICS | Age: 40
Discharge: HOME | End: 2020-11-13
Attending: INTERNAL MEDICINE
Payer: COMMERCIAL

## 2020-11-13 VITALS
HEIGHT: 69 IN | WEIGHT: 315 LBS | DIASTOLIC BLOOD PRESSURE: 57 MMHG | SYSTOLIC BLOOD PRESSURE: 102 MMHG | BODY MASS INDEX: 46.65 KG/M2

## 2020-11-13 DIAGNOSIS — I42.9 CARDIOMYOPATHY, UNSPECIFIED TYPE (HCC): ICD-10-CM

## 2020-11-13 LAB
STRESS ANGINA INDEX: 0
STRESS BASELINE HR: 100 BPM
STRESS ESTIMATED WORKLOAD: 8.4 METS
STRESS EXERCISE DUR MIN: NORMAL
STRESS PEAK DIAS BP: 88 MMHG
STRESS PEAK SYS BP: 159 MMHG
STRESS PERCENT HR ACHIEVED: 91 %
STRESS POST PEAK HR: 164 BPM
STRESS RATE PRESSURE PRODUCT: NORMAL BPM*MMHG
STRESS TARGET HR: 181 BPM

## 2020-11-13 PROCEDURE — C8929 TTE W OR WO FOL WCON,DOPPLER: HCPCS

## 2020-11-13 PROCEDURE — 74011250636 HC RX REV CODE- 250/636: Performed by: INTERNAL MEDICINE

## 2020-11-13 RX ADMIN — PERFLUTREN 1 ML: 6.52 INJECTION, SUSPENSION INTRAVENOUS at 15:12

## 2020-11-14 LAB
AV VELOCITY RATIO: 0.61
AV VTI RATIO: 0.6
ECHO AO ASC DIAM: 2.91 CM
ECHO AO ROOT DIAM: 2.84 CM
ECHO AV AREA PEAK VELOCITY: 1.9 CM2
ECHO AV AREA VTI: 2 CM2
ECHO AV AREA/BSA PEAK VELOCITY: 0.7 CM2/M2
ECHO AV AREA/BSA VTI: 0.8 CM2/M2
ECHO AV MEAN GRADIENT: 2.3 MMHG
ECHO AV MEAN VELOCITY: 0.73 M/S
ECHO AV PEAK GRADIENT: 3.4 MMHG
ECHO AV PEAK VELOCITY: 92.65 CM/S
ECHO AV VTI: 17.36 CM
ECHO IVC PROX: 1.41 CM
ECHO LA MAJOR AXIS: 5.58 CM
ECHO LA MINOR AXIS: 2.19 CM
ECHO LA TO AORTIC ROOT RATIO: 1.96
ECHO LA VOL 2C: 107.53 ML (ref 18–58)
ECHO LA VOL 4C: 74.63 ML (ref 18–58)
ECHO LA VOL BP: 106.01 ML (ref 18–58)
ECHO LA VOL/BSA BIPLANE: 41.7 ML/M2 (ref 16–28)
ECHO LA VOLUME INDEX A2C: 42.3 ML/M2 (ref 16–28)
ECHO LA VOLUME INDEX A4C: 29.35 ML/M2 (ref 16–28)
ECHO LV E' LATERAL VELOCITY: 11 CM/S
ECHO LV E' SEPTAL VELOCITY: 6 CM/S
ECHO LV EDV A2C: 372.6 ML
ECHO LV EDV A4C: 345.6 ML
ECHO LV EDV BP: 364.4 ML (ref 67–155)
ECHO LV EDV INDEX A4C: 135.9 ML/M2
ECHO LV EDV INDEX BP: 143.3 ML/M2
ECHO LV EDV NDEX A2C: 146.6 ML/M2
ECHO LV EDV TEICHHOLZ: 1.66 ML
ECHO LV EJECTION FRACTION A2C: 16 %
ECHO LV EJECTION FRACTION A4C: 27 %
ECHO LV EJECTION FRACTION BIPLANE: 19.8 % (ref 55–100)
ECHO LV ESV A2C: 313.6 ML
ECHO LV ESV A4C: 253.6 ML
ECHO LV ESV BP: 292.2 ML (ref 22–58)
ECHO LV ESV INDEX A2C: 123.4 ML/M2
ECHO LV ESV INDEX A4C: 99.7 ML/M2
ECHO LV ESV INDEX BP: 114.9 ML/M2
ECHO LV ESV TEICHHOLZ: 1.31 ML
ECHO LV INTERNAL DIMENSION DIASTOLIC: 7.42 CM (ref 4.2–5.9)
ECHO LV INTERNAL DIMENSION SYSTOLIC: 6.67 CM
ECHO LV IVSD: 0.93 CM (ref 0.6–1)
ECHO LV MASS 2D: 347.4 G (ref 88–224)
ECHO LV MASS INDEX 2D: 136.6 G/M2 (ref 49–115)
ECHO LV POSTERIOR WALL DIASTOLIC: 1.03 CM (ref 0.6–1)
ECHO LVOT DIAM: 2.01 CM
ECHO LVOT PEAK GRADIENT: 1.3 MMHG
ECHO LVOT PEAK VELOCITY: 56.56 CM/S
ECHO LVOT SV: 35.1 ML
ECHO LVOT VTI: 11.01 CM
ECHO MV A VELOCITY: 68.65 CM/S
ECHO MV AREA PHT: 6.8 CM2
ECHO MV E DECELERATION TIME (DT): 111.2 MS
ECHO MV E VELOCITY: 110.32 CM/S
ECHO MV E/A RATIO: 1.61
ECHO MV E/E' LATERAL: 10.03
ECHO MV E/E' RATIO (AVERAGED): 14.21
ECHO MV E/E' SEPTAL: 18.39
ECHO MV MEAN INFLOW VELOCITY: 3.29 M/S
ECHO MV PRESSURE HALF TIME (PHT): 32.3 MS
ECHO MV REGURGITANT PEAK GRADIENT: 89.3 MMHG
ECHO MV REGURGITANT PEAK VELOCITY: 472.47 CM/S
ECHO MV REGURGITANT VTIA: 148.16 CM
ECHO PV REGURGITANT MAX VELOCITY: 472.47 CM/S
ECHO RA AREA 4C: 14.28 CM2
ECHO RV INTERNAL DIMENSION: 2.93 CM
ECHO RV TAPSE: 2.2 CM (ref 1.5–2)
ECHO TV MEAN GRADIENT: 52.6 MMHG
LVFS 2D: 10.05 %
LVOT MG: 0.75 MMHG
LVOT MV: 0.41 CM/S
LVSV (MOD BI): 26.91 ML
LVSV (MOD SINGLE 4C): 34.28 ML
LVSV (MOD SINGLE): 22 ML
LVSV (TEICH): 23.04 ML
MV DEC SLOPE: 9.92

## 2020-11-16 ENCOUNTER — OFFICE VISIT (OUTPATIENT)
Dept: CARDIOLOGY CLINIC | Age: 40
End: 2020-11-16
Payer: MEDICAID

## 2020-11-16 VITALS
SYSTOLIC BLOOD PRESSURE: 114 MMHG | HEART RATE: 105 BPM | RESPIRATION RATE: 16 BRPM | DIASTOLIC BLOOD PRESSURE: 75 MMHG | TEMPERATURE: 96.5 F | BODY MASS INDEX: 46.65 KG/M2 | WEIGHT: 315 LBS | HEIGHT: 69 IN | OXYGEN SATURATION: 97 %

## 2020-11-16 DIAGNOSIS — I10 ESSENTIAL HYPERTENSION: Primary | ICD-10-CM

## 2020-11-16 DIAGNOSIS — Z01.818 PRE-OP TESTING: ICD-10-CM

## 2020-11-16 PROCEDURE — 99214 OFFICE O/P EST MOD 30 MIN: CPT | Performed by: INTERNAL MEDICINE

## 2020-11-16 NOTE — PATIENT INSTRUCTIONS
*Pre -procedure LABWORK is to be done within one week of your procedure date **YOU WILL NEED SOMEONE TO DRIVE YOU HOME AFTER PROCEDURE. 1. You are scheduled to have a left heart catherization on  December 7, 2020 at 9 am Please check in at 7 am.  
 
2. Please go to [facility] [address] and park in the outpatient parking. Once you enter check in with the  there. The  will either give you directions or assist you in getting to the cath holding area. 4. You are not to eat or drink anything after midnight the night prior to procedure. You may take a sip of water to take morning medications. If you are diabetic, Do Not take your insulin/sugar pill the morning of the procedure. 5. MEDICATION INSTRUCTIONS:   Please take your morning medications with the following special instructions: 
 
[x]          Please make sure to take your Blood pressure medication. [x]          Take your Aspirin and/or Plavix. 6. We encourage families to wait in the waiting room on the first floor while the procedure is being done. The Doctor will come out and talk with you as soon as the procedure is over. 7. There is the possibility that you may spend the night in the hospital, depending on the results of the procedure. This will be determined after the procedure is done. If angioplasty or stent is planned, you will stay at least one day. 8. If you or your family have any questions, please call our office Monday Friday, 9:00 a. m.4:30 p.m.,  At [phone], and ask to speak to one of the nurses. 9. Be sure you have someone to drive you home, you will not be able to drive after the procedure. Patient verbalized understanding of the above instructions and was advised to call office with any further questions or concerns at [phone].

## 2020-11-16 NOTE — PROGRESS NOTES
Jordyn Guzmán presents today for   Chief Complaint   Patient presents with    Follow-up     after Echo and Stress test       Jordyn Guzmán preferred language for health care discussion is english/other. Personal Protective Equipment:   Personal Protective Equipment was used including: mask-surgical and hands-gloves. Patient was placed on no precaution(s). Patient was masked. Is someone accompanying this pt? No    Is the patient using any DME equipment during OV? No    Depression Screening:  3 most recent PHQ Screens 9/30/2020   Little interest or pleasure in doing things Not at all   Feeling down, depressed, irritable, or hopeless Not at all   Total Score PHQ 2 0       Learning Assessment:  Learning Assessment 7/30/2019   PRIMARY LEARNER Patient   HIGHEST LEVEL OF EDUCATION - PRIMARY LEARNER  GRADUATED HIGH SCHOOL OR GED   BARRIERS PRIMARY LEARNER NONE   PRIMARY LANGUAGE ENGLISH   LEARNER PREFERENCE PRIMARY READING     VIDEOS   ANSWERED BY pt   RELATIONSHIP SELF       Abuse Screening:  Abuse Screening Questionnaire 7/14/2020   Do you ever feel afraid of your partner? N   Are you in a relationship with someone who physically or mentally threatens you? N   Is it safe for you to go home? Y       Fall Risk  Fall Risk Assessment, last 12 mths 7/14/2020   Able to walk? Yes   Fall in past 12 months? No       Pt currently taking Anticoagulant therapy? No    Coordination of Care:  1. Have you been to the ER, urgent care clinic since your last visit? Hospitalized since your last visit? Yes; [facility] ER 10/06/2020 - Chest Pain    2. Have you seen or consulted any other health care providers outside of the [address] [place] [address] since your last visit? Include any pap smears or colon screening.  No

## 2020-11-22 NOTE — PROGRESS NOTES
Subjective:      Chela Fatima is in the office today for cardiac re-evaluation. He is a 73-year-old man that was  hospitalized at [address] [place] [address] in July 2019 for congestive heart failure. He presented to the emergency department with complaints of shortness of breath that had worsened in the prior 48 hours. An echocardiogram demonstrated severe systolic dysfunction with an ejection fraction of 16 to 20%. There was grade 2 diastolic dysfunction. He was started on diuretics and Entresto and discharged for follow-up. In the office on 8/27/2019, he said that he  had no specific problems. His breathing had been good. He  had no peripheral swelling. He also said he \"feels a little more normal \". He was started on carvedilol 3.125 twice daily. He was already taking Entresto 24/26 twice daily and furosemide 20 mg daily. He was to return in 6 weeks. For unclear reasons, he was not seen for another 6 months. He stopped Entresto because he felt terrible while taking it. He reported being seen in the emergency department almost monthly in the prior several months. In the office today he reports that he feels \"fine\". He says he has good days and bad days. On the day prior to this visit he said he had a hard time breathing. The echocardiogram was repeated on 11/13/2020. Ejection fraction was still 15 to 20%. He also had a nuclear stress test with 2 defects as noted below. We discussed my recommendation for placement of a ICD. The patient declines on that offer. He understands the risks involved. I also recommended cardiac catheterization which he agrees to and so will schedule sometime here in the near future.       Patient Active Problem List    Diagnosis Date Noted    Hyperlipidemia 07/10/2019    Acute combined systolic (congestive) and diastolic (congestive) heart failure ([address] 07/09/2019    HTN (hypertension) 07/09/2019    Morbid obesity ([facility] 07/09/2019    Acute CHF (congestive heart failure) ([facility] 2019     Current Outpatient Medications   Medication Sig Dispense Refill    carvediloL (Coreg) 6.25 mg tablet Take 1 Tab by mouth two (2) times daily (with meals). 180 Tab 3    lisinopriL (PRINIVIL, ZESTRIL) 10 mg tablet Take 1 Tab by mouth daily. 90 Tab 3    spironolactone (ALDACTONE) 25 mg tablet Take 1 Tab by mouth daily. 30 Tab 6    furosemide (LASIX) 20 mg tablet TAKE 1 TABLET BY MOUTH EVERY DAY AS NEEDED FOR PERIPHERAL SWELLING 30 Tab 0     No Known Allergies  Past Medical History:   Diagnosis Date    Congestive heart failure (HCC)      No past surgical history on file. Family History   Problem Relation Age of Onset   [place] Glaucoma Mother     Other Mother     Hypertension Father     No Known Problems Sister     No Known Problems Brother     No Known Problems Sister     Heart Disease Paternal Grandmother          of MI     Social History     Tobacco Use   Smoking Status Never Smoker   Smokeless Tobacco Never Used          Review of Systems, additional:  Constitutional: negative  Eyes: negative  Respiratory: negative  Cardiovascular: negative  Gastrointestinal: negative  Musculoskeletal:negative  Neurological: negative  Behvioral/Psych: negative  Endocrine: negative  ENT: negative    Objective:     Visit Vitals  /75 (BP 1 Location: Right arm, BP Patient Position: Sitting)   Pulse (!) 105   Temp (!) 96.5 °F (35.8 °C) (Temporal)   Resp 16   Ht 5' 9\" (1.753 m)   Wt 325 lb 6.4 oz (147.6 kg)   SpO2 97%   BMI 48.05 kg/m²     General:  alert, cooperative, no distress   Chest Wall: inspection normal - no chest wall deformities or tenderness, respiratory effort normal   Lung: clear to auscultation bilaterally   Heart:  normal rate and regular rhythm, S1 and S2 normal, no murmurs noted, no gallops noted, no JVD   Abdomen: soft, non-tender.  Bowel sounds normal. No masses,  no organomegaly   Extremities: extremities normal, atraumatic, no cyanosis or edema Skin: no rashes   Neuro: alert, oriented, normal speech, no focal findings or movement disorder noted     [id number]. Sinus tachycardia. Diffuse nondiagnostic ST and T wave abnormalities    Assessment/Plan:       ICD-10-CM ICD-9-CM    1. Acute combined systolic (congestive) and diastolic (congestive) heart failure ([address] symptoms stable. He is adherent to a low-sodium diet. He could not tolerate Entresto. At the last visit the carvedilol was increased to 6.25 mg twice daily. Echocardiogram repeated on 3/11/2020. EF 15 to 20%. Grade 3 diastolic dysfunction. Echo repeated on 2020. Ejection fraction 15 to 20%. Grade 1 diastolic dysfunction. Mild to moderate MR. Stress test done 2020. EF 21%. There was a defect that was large in size present in the inferior wall that was nonreversible. There was a second defect that was medium in size with a moderate reduction in uptake affecting the anteroseptal location that appeared to be partially reversible. Recommended cardiac catheterization and consideration for AICD. The patient does not want to consider AICD. He appears understand the risks  including sudden cardiac death. Will arrange for cardiac catheterization. I50.41 428.41      428.0    2. Essential hypertension, BP controlled in the office today I10 401.9    3. Hyperlipidemia, unspecified hyperlipidemia type E78.5 272.4    4.  Morbid obesity ([address] E66.01 278.01

## 2020-12-03 ENCOUNTER — DOCUMENTATION ONLY (OUTPATIENT)
Dept: CARDIOLOGY CLINIC | Age: 40
End: 2020-12-03

## 2020-12-03 NOTE — PROGRESS NOTES
Contacted [facility] better [facility] at [phone]. Two patient Identifiers confirmed. Advised no PA requited for [facility]( [id number]). Case ID # [id number]. No other issues noted,.